# Patient Record
Sex: MALE | Race: WHITE | NOT HISPANIC OR LATINO | Employment: FULL TIME | URBAN - METROPOLITAN AREA
[De-identification: names, ages, dates, MRNs, and addresses within clinical notes are randomized per-mention and may not be internally consistent; named-entity substitution may affect disease eponyms.]

---

## 2024-03-09 ENCOUNTER — HOSPITAL ENCOUNTER (INPATIENT)
Facility: HOSPITAL | Age: 24
LOS: 2 days | Discharge: HOME | DRG: 813 | End: 2024-03-12
Attending: STUDENT IN AN ORGANIZED HEALTH CARE EDUCATION/TRAINING PROGRAM | Admitting: INTERNAL MEDICINE
Payer: COMMERCIAL

## 2024-03-09 DIAGNOSIS — D69.3 ACUTE ITP: Primary | ICD-10-CM

## 2024-03-09 DIAGNOSIS — A53.0 SYPHILIS, LATENT: ICD-10-CM

## 2024-03-09 LAB
ALBUMIN SERPL-MCNC: 4.4 G/DL (ref 3.5–5.2)
ALBUMIN/GLOB SERPL: 2 G/DL
ALP SERPL-CCNC: 54 U/L (ref 39–117)
ALT SERPL W P-5'-P-CCNC: 28 U/L (ref 1–41)
ANION GAP SERPL CALCULATED.3IONS-SCNC: 9.8 MMOL/L (ref 5–15)
AST SERPL-CCNC: 27 U/L (ref 1–40)
BASOPHILS # BLD AUTO: 0.02 10*3/MM3 (ref 0–0.2)
BASOPHILS NFR BLD AUTO: 0.4 % (ref 0–1.5)
BILIRUB SERPL-MCNC: 0.3 MG/DL (ref 0–1.2)
BUN SERPL-MCNC: 15 MG/DL (ref 6–20)
BUN/CREAT SERPL: 15.6 (ref 7–25)
CALCIUM SPEC-SCNC: 8.8 MG/DL (ref 8.6–10.5)
CHLORIDE SERPL-SCNC: 105 MMOL/L (ref 98–107)
CO2 SERPL-SCNC: 24.2 MMOL/L (ref 22–29)
CREAT SERPL-MCNC: 0.96 MG/DL (ref 0.76–1.27)
DEPRECATED RDW RBC AUTO: 41.4 FL (ref 37–54)
EGFRCR SERPLBLD CKD-EPI 2021: 113.9 ML/MIN/1.73
EOSINOPHIL # BLD AUTO: 0.05 10*3/MM3 (ref 0–0.4)
EOSINOPHIL NFR BLD AUTO: 0.9 % (ref 0.3–6.2)
ERYTHROCYTE [DISTWIDTH] IN BLOOD BY AUTOMATED COUNT: 12.8 % (ref 12.3–15.4)
GLOBULIN UR ELPH-MCNC: 2.2 GM/DL
GLUCOSE BLDC GLUCOMTR-MCNC: 128 MG/DL (ref 70–130)
GLUCOSE SERPL-MCNC: 105 MG/DL (ref 65–99)
HCT VFR BLD AUTO: 42.1 % (ref 37.5–51)
HGB BLD-MCNC: 14.5 G/DL (ref 13–17.7)
HOLD SPECIMEN: NORMAL
LYMPHOCYTES # BLD AUTO: 2.36 10*3/MM3 (ref 0.7–3.1)
LYMPHOCYTES NFR BLD AUTO: 43.4 % (ref 19.6–45.3)
MCH RBC QN AUTO: 30.9 PG (ref 26.6–33)
MCHC RBC AUTO-ENTMCNC: 34.4 G/DL (ref 31.5–35.7)
MCV RBC AUTO: 89.8 FL (ref 79–97)
MONOCYTES # BLD AUTO: 0.62 10*3/MM3 (ref 0.1–0.9)
MONOCYTES NFR BLD AUTO: 11.4 % (ref 5–12)
NEUTROPHILS NFR BLD AUTO: 2.38 10*3/MM3 (ref 1.7–7)
NEUTROPHILS NFR BLD AUTO: 43.7 % (ref 42.7–76)
PLATELET # BLD AUTO: 14 10*3/MM3 (ref 140–450)
PMV BLD AUTO: 13.4 FL (ref 6–12)
POTASSIUM SERPL-SCNC: 4 MMOL/L (ref 3.5–5.2)
PROT SERPL-MCNC: 6.6 G/DL (ref 6–8.5)
RBC # BLD AUTO: 4.69 10*6/MM3 (ref 4.14–5.8)
SODIUM SERPL-SCNC: 139 MMOL/L (ref 136–145)
WBC NRBC COR # BLD AUTO: 5.44 10*3/MM3 (ref 3.4–10.8)
WHOLE BLOOD HOLD COAG: NORMAL
WHOLE BLOOD HOLD SPECIMEN: NORMAL

## 2024-03-09 PROCEDURE — 86705 HEP B CORE ANTIBODY IGM: CPT | Performed by: INTERNAL MEDICINE

## 2024-03-09 PROCEDURE — 63710000001 PREDNISONE PER 5 MG: Performed by: INTERNAL MEDICINE

## 2024-03-09 PROCEDURE — 82948 REAGENT STRIP/BLOOD GLUCOSE: CPT

## 2024-03-09 PROCEDURE — 63710000001 PREDNISONE PER 1 MG: Performed by: INTERNAL MEDICINE

## 2024-03-09 PROCEDURE — 82607 VITAMIN B-12: CPT | Performed by: INTERNAL MEDICINE

## 2024-03-09 PROCEDURE — G0378 HOSPITAL OBSERVATION PER HR: HCPCS

## 2024-03-09 PROCEDURE — 80053 COMPREHEN METABOLIC PANEL: CPT

## 2024-03-09 PROCEDURE — 36415 COLL VENOUS BLD VENIPUNCTURE: CPT

## 2024-03-09 PROCEDURE — 99285 EMERGENCY DEPT VISIT HI MDM: CPT

## 2024-03-09 PROCEDURE — 85025 COMPLETE CBC W/AUTO DIFF WBC: CPT

## 2024-03-09 RX ORDER — SODIUM CHLORIDE 0.9 % (FLUSH) 0.9 %
10 SYRINGE (ML) INJECTION AS NEEDED
Status: DISCONTINUED | OUTPATIENT
Start: 2024-03-09 | End: 2024-03-12 | Stop reason: HOSPADM

## 2024-03-09 RX ORDER — ACETAMINOPHEN 650 MG/1
650 SUPPOSITORY RECTAL EVERY 4 HOURS PRN
Status: DISCONTINUED | OUTPATIENT
Start: 2024-03-09 | End: 2024-03-12 | Stop reason: HOSPADM

## 2024-03-09 RX ORDER — NICOTINE POLACRILEX 4 MG
15 LOZENGE BUCCAL
Status: DISCONTINUED | OUTPATIENT
Start: 2024-03-09 | End: 2024-03-11

## 2024-03-09 RX ORDER — IBUPROFEN 600 MG/1
1 TABLET ORAL
Status: DISCONTINUED | OUTPATIENT
Start: 2024-03-09 | End: 2024-03-11

## 2024-03-09 RX ORDER — BISACODYL 10 MG
10 SUPPOSITORY, RECTAL RECTAL DAILY PRN
Status: DISCONTINUED | OUTPATIENT
Start: 2024-03-09 | End: 2024-03-12 | Stop reason: HOSPADM

## 2024-03-09 RX ORDER — AMOXICILLIN 250 MG
2 CAPSULE ORAL 2 TIMES DAILY PRN
Status: DISCONTINUED | OUTPATIENT
Start: 2024-03-09 | End: 2024-03-12 | Stop reason: HOSPADM

## 2024-03-09 RX ORDER — BISACODYL 5 MG/1
5 TABLET, DELAYED RELEASE ORAL DAILY PRN
Status: DISCONTINUED | OUTPATIENT
Start: 2024-03-09 | End: 2024-03-12 | Stop reason: HOSPADM

## 2024-03-09 RX ORDER — INSULIN LISPRO 100 [IU]/ML
2-7 INJECTION, SOLUTION INTRAVENOUS; SUBCUTANEOUS
Status: DISCONTINUED | OUTPATIENT
Start: 2024-03-09 | End: 2024-03-11

## 2024-03-09 RX ORDER — ACETAMINOPHEN 325 MG/1
650 TABLET ORAL EVERY 4 HOURS PRN
Status: DISCONTINUED | OUTPATIENT
Start: 2024-03-09 | End: 2024-03-12 | Stop reason: HOSPADM

## 2024-03-09 RX ORDER — ACETAMINOPHEN 160 MG/5ML
650 SOLUTION ORAL EVERY 4 HOURS PRN
Status: DISCONTINUED | OUTPATIENT
Start: 2024-03-09 | End: 2024-03-12 | Stop reason: HOSPADM

## 2024-03-09 RX ORDER — ONDANSETRON 2 MG/ML
4 INJECTION INTRAMUSCULAR; INTRAVENOUS EVERY 6 HOURS PRN
Status: DISCONTINUED | OUTPATIENT
Start: 2024-03-09 | End: 2024-03-12 | Stop reason: HOSPADM

## 2024-03-09 RX ORDER — POLYETHYLENE GLYCOL 3350 17 G/17G
17 POWDER, FOR SOLUTION ORAL DAILY PRN
Status: DISCONTINUED | OUTPATIENT
Start: 2024-03-09 | End: 2024-03-12 | Stop reason: HOSPADM

## 2024-03-09 RX ORDER — DEXTROSE MONOHYDRATE 25 G/50ML
25 INJECTION, SOLUTION INTRAVENOUS
Status: DISCONTINUED | OUTPATIENT
Start: 2024-03-09 | End: 2024-03-11

## 2024-03-09 RX ADMIN — PREDNISONE 70 MG: 20 TABLET ORAL at 19:57

## 2024-03-09 NOTE — ED PROVIDER NOTES
EMERGENCY DEPARTMENT ENCOUNTER  Room Number:  S511/1  PCP: Bertha Riggs MD  Independent Historians: Patient and Family      HPI:  Chief Complaint: had concerns including Abnormal Lab.     Context: Choco Rico is a 23 y.o. male who presents to the ED c/o acute bruising and low platelets.  Patient is noted significant bruising on his upper extremities that seems to occur without any prompting.  Patient particularly concerned about 1 located on the medial aspect of his left bicep with no traumatic injury experienced.  Patient had evaluation by primary care with outpatient labs ordered.  Labs returned with very low platelet count and patient was instructed to come to the emergency department.  Patient denies recent respiratory illness, chest pain, shortness of breath.      Review of prior external notes (non-ED) -and- Review of prior external test results outside of this encounter: Extensive review of the EPIC system as well as [facility] reveals no prior visit notes and no prior diagnostic studies available for review.    PAST MEDICAL HISTORY  Active Ambulatory Problems     Diagnosis Date Noted    No Active Ambulatory Problems     Resolved Ambulatory Problems     Diagnosis Date Noted    No Resolved Ambulatory Problems     No Additional Past Medical History         PAST SURGICAL HISTORY  History reviewed. No pertinent surgical history.      FAMILY HISTORY  History reviewed. No pertinent family history.      SOCIAL HISTORY  Social History     Socioeconomic History    Marital status: Single   Tobacco Use    Smoking status: Never    Smokeless tobacco: Never   Substance and Sexual Activity    Alcohol use: Defer    Drug use: Defer    Sexual activity: Yes         ALLERGIES  Patient has no known allergies.      REVIEW OF SYSTEMS  Review of Systems  Included in HPI  All systems reviewed and negative except for those discussed in HPI.      PHYSICAL EXAM    I have reviewed the triage vital signs and nursing  notes.    ED Triage Vitals   Temp Heart Rate Resp BP SpO2   03/09/24 1654 03/09/24 1654 03/09/24 1654 03/09/24 1712 03/09/24 1654   97 °F (36.1 °C) 82 20 114/65 100 %      Temp src Heart Rate Source Patient Position BP Location FiO2 (%)   03/09/24 1654 03/09/24 1654 -- -- --   Tympanic Monitor          Physical Exam  GENERAL: alert, no acute distress  SKIN: Warm, dry.  Ecchymosis noted to the medial aspect of the left bicep  HENT: Normocephalic, atraumatic  EYES: no scleral icterus  CV: regular rhythm, regular rate  RESPIRATORY: normal effort, lungs clear  ABDOMEN: soft, nontender, nondistended  MUSCULOSKELETAL: no deformity  NEURO: alert, moves all extremities, follows commands            LAB RESULTS  Recent Results (from the past 24 hour(s))   Lavender Top    Collection Time: 03/09/24  5:02 PM   Result Value Ref Range    Extra Tube hold for add-on    Gold Top - SST    Collection Time: 03/09/24  5:02 PM   Result Value Ref Range    Extra Tube Hold for add-ons.    Light Blue Top    Collection Time: 03/09/24  5:02 PM   Result Value Ref Range    Extra Tube Hold for add-ons.    CBC Auto Differential    Collection Time: 03/09/24  5:02 PM    Specimen: Blood   Result Value Ref Range    WBC 5.44 3.40 - 10.80 10*3/mm3    RBC 4.69 4.14 - 5.80 10*6/mm3    Hemoglobin 14.5 13.0 - 17.7 g/dL    Hematocrit 42.1 37.5 - 51.0 %    MCV 89.8 79.0 - 97.0 fL    MCH 30.9 26.6 - 33.0 pg    MCHC 34.4 31.5 - 35.7 g/dL    RDW 12.8 12.3 - 15.4 %    RDW-SD 41.4 37.0 - 54.0 fl    MPV 13.4 (H) 6.0 - 12.0 fL    Platelets 14 (C) 140 - 450 10*3/mm3    Neutrophil % 43.7 42.7 - 76.0 %    Lymphocyte % 43.4 19.6 - 45.3 %    Monocyte % 11.4 5.0 - 12.0 %    Eosinophil % 0.9 0.3 - 6.2 %    Basophil % 0.4 0.0 - 1.5 %    Neutrophils, Absolute 2.38 1.70 - 7.00 10*3/mm3    Lymphocytes, Absolute 2.36 0.70 - 3.10 10*3/mm3    Monocytes, Absolute 0.62 0.10 - 0.90 10*3/mm3    Eosinophils, Absolute 0.05 0.00 - 0.40 10*3/mm3    Basophils, Absolute 0.02 0.00 - 0.20  10*3/mm3   Comprehensive Metabolic Panel    Collection Time: 03/09/24  5:43 PM    Specimen: Blood   Result Value Ref Range    Glucose 105 (H) 65 - 99 mg/dL    BUN 15 6 - 20 mg/dL    Creatinine 0.96 0.76 - 1.27 mg/dL    Sodium 139 136 - 145 mmol/L    Potassium 4.0 3.5 - 5.2 mmol/L    Chloride 105 98 - 107 mmol/L    CO2 24.2 22.0 - 29.0 mmol/L    Calcium 8.8 8.6 - 10.5 mg/dL    Total Protein 6.6 6.0 - 8.5 g/dL    Albumin 4.4 3.5 - 5.2 g/dL    ALT (SGPT) 28 1 - 41 U/L    AST (SGOT) 27 1 - 40 U/L    Alkaline Phosphatase 54 39 - 117 U/L    Total Bilirubin 0.3 0.0 - 1.2 mg/dL    Globulin 2.2 gm/dL    A/G Ratio 2.0 g/dL    BUN/Creatinine Ratio 15.6 7.0 - 25.0    Anion Gap 9.8 5.0 - 15.0 mmol/L    eGFR 113.9 >60.0 mL/min/1.73         RADIOLOGY  No Radiology Exams Resulted Within Past 24 Hours      MEDICATIONS GIVEN IN ER  Medications   sodium chloride 0.9 % flush 10 mL (has no administration in time range)   acetaminophen (TYLENOL) tablet 650 mg (has no administration in time range)     Or   acetaminophen (TYLENOL) 160 MG/5ML oral solution 650 mg (has no administration in time range)     Or   acetaminophen (TYLENOL) suppository 650 mg (has no administration in time range)   ondansetron (ZOFRAN) injection 4 mg (has no administration in time range)   sennosides-docusate (PERICOLACE) 8.6-50 MG per tablet 2 tablet (has no administration in time range)     And   polyethylene glycol (MIRALAX) packet 17 g (has no administration in time range)     And   bisacodyl (DULCOLAX) EC tablet 5 mg (has no administration in time range)     And   bisacodyl (DULCOLAX) suppository 10 mg (has no administration in time range)         ORDERS PLACED DURING THIS VISIT:  Orders Placed This Encounter   Procedures    [place] Draw    Comprehensive Metabolic Panel    CBC Auto Differential    Basic Metabolic Panel    CBC Auto Differential    Diet: Regular/House; Fluid Consistency: Thin (IDDSI 0)    Vital Signs    Up With Assistance    Intake & Output     Oral Care    Place Sequential Compression Device    Maintain Sequential Compression Device    Code Status and Medical Interventions:    Inpatient Hematology & Oncology Consult    Insert peripheral IV    Initiate Observation Status    CBC & Differential    Lavender Top    Gold Top - SST    Light Blue Top         OUTPATIENT MEDICATION MANAGEMENT:  Current Facility-Administered Medications Ordered in Epic   Medication Dose Route Frequency Provider Last Rate Last Admin    acetaminophen (TYLENOL) tablet 650 mg  650 mg Oral Q4H PRN Mera Rios MD        Or    acetaminophen (TYLENOL) 160 MG/5ML oral solution 650 mg  650 mg Oral Q4H PRN Mera Rios MD        Or    acetaminophen (TYLENOL) suppository 650 mg  650 mg Rectal Q4H PRN Blanca, Mera Rapp MD        sennosides-docusate (PERICOLACE) 8.6-50 MG per tablet 2 tablet  2 tablet Oral BID PRN Mera Rios MD        And    polyethylene glycol (MIRALAX) packet 17 g  17 g Oral Daily PRN Blanca, Mera Rapp MD        And    bisacodyl (DULCOLAX) EC tablet 5 mg  5 mg Oral Daily PRN Blanca, Mera Rapp MD        And    bisacodyl (DULCOLAX) suppository 10 mg  10 mg Rectal Daily PRN Blanca, Mera Rapp MD        ondansetron (ZOFRAN) injection 4 mg  4 mg Intravenous Q6H PRN Blanca, Mera Rapp MD        sodium chloride 0.9 % flush 10 mL  10 mL Intravenous PRN Emergency, Triage Protocol, MD         No current [facility]-ordered outpatient medications on file.         PROGRESS, DATA ANALYSIS, CONSULTS, AND MEDICAL DECISION MAKING  All labs have been independently interpreted by me.  All radiology studies have been reviewed by me. All EKG's have been independently viewed and interpreted by me.  Discussion below represents my analysis of pertinent findings related to patient's condition, differential diagnosis, treatment plan and final disposition.    Differential diagnosis includes but is not limited to ecchymosis, ITP, TTP.    Clinical Scores:                    ED Course as of 03/09/24 1840   Sat Mar 09, 2024   1840 23-year-old male presenting for evaluation of spontaneous bruising.  Patient evaluated outpatient clinic and found to have low platelets and sent for further evaluation.  Confirm platelet count of 14 in the emergency department.  Patient will require admission for further evaluation of ITP. [MW]      ED Course User Index  [MW] Mark Wagner MD             AS OF 18:40 EST VITALS:    BP - 120/67  HR - 82  TEMP - 97 °F (36.1 °C) (Tympanic)  O2 SATS - 100%    COMPLEXITY OF CARE  The patient requires admission.      DIAGNOSIS  Final diagnoses:   Acute ITP         DISPOSITION  ED Disposition       ED Disposition   Decision to Admit    Condition   --    Comment   Level of Care: Med/Surg [1]   Diagnosis: Acute ITP [[id number]]   Admitting Physician: ISIDORO MONCADA [[id number]]   Attending Physician: ISIDORO MONCADA [[id number]]                  Please note that portions of this document were completed with a voice recognition program.    Note Disclaimer: At [facility], we believe that sharing information builds trust and better relationships. You are receiving this note because you recently visited [facility]. It is possible you will see health information before a provider has talked with you about it. This kind of information can be easy to misunderstand. To help you fully understand what it means for your health, we urge you to discuss this note with your provider.         Mark Wagner MD  03/09/24 1840

## 2024-03-09 NOTE — ED NOTES
Nursing report ED to floor  Redfox Rico  23 y.o.  male    HPI :   Chief Complaint   Patient presents with    Abnormal Lab       Admitting doctor:   Mera Rios MD    Admitting diagnosis:   The encounter diagnosis was Acute ITP.    Code status:   Current Code Status       Date Active Code Status Order ID Comments User Context       Not on file            Allergies:   Patient has no known allergies.    Intake and Output  No intake or output data in the 24 hours ending 03/09/24 1743    Weight:   There were no vitals filed for this visit.    Most recent vitals:   Vitals:    03/09/24 1654 03/09/24 1712 03/09/24 1715   BP:  114/65 120/67   Pulse: 82     Resp: 20     Temp: 97 °F (36.1 °C)     TempSrc: Tympanic     SpO2: 100%         Active LDAs/IV Access:   Lines, Drains & Airways       Active LDAs       Name Placement date Placement time Site Days    Peripheral IV 03/09/24 1713 Left Antecubital 03/09/24  1713  Antecubital  less than 1                    Labs (abnormal labs have a star):   Labs Reviewed   CBC WITH AUTO DIFFERENTIAL - Abnormal; Notable for the following components:       Result Value    MPV 13.4 (*)     Platelets 14 (*)     All other components within normal limits   RAINBOW DRAW    Narrative:     The following orders were created for panel order [place] Draw.  Procedure                               Abnormality         Status                     ---------                               -----------         ------                     Green Top (Gel)[[id number]]                                                             Lavender Top[[id number]]                                     In process                 Gold Top - SST[[id number]]                                   In process                 Light Blue Top[[id number]]                                   In process                   Please view results for these tests on the individual orders.   COMPREHENSIVE METABOLIC PANEL   CBC AND DIFFERENTIAL     Narrative:     The following orders were created for panel order CBC & Differential.  Procedure                               Abnormality         Status                     ---------                               -----------         ------                     CBC Auto Differential[[id number]]        Abnormal            Final result                 Please view results for these tests on the individual orders.   LAVENDER TOP   GOLD TOP - SST   LIGHT BLUE TOP       EKG:   No orders to display       Meds given in ED:   Medications   sodium chloride 0.9 % flush 10 mL (has no administration in time range)       Imaging results:  No radiology results for the last day    Ambulatory status:   - up ad gretchen    Social issues:   Social History     Socioeconomic History    Marital status: Single       NIH Stroke Scale:        Nursing report ED to floor:

## 2024-03-09 NOTE — ED NOTES
Pt to ed from home via PV    Pt had blood work done earlier today because he has been noticing random bruises all over body. Pt was called and told his platelets are low.

## 2024-03-09 NOTE — LETTER
March 12, 2024     Patient: Choco Rico   YOB: 2000   Date of Visit: 3/9/2024       To Whom It May Concern:    Pt admitted to [facility] 3/9/2024. Per Dr. Parker pt to be off work until cleared completely by the Hematology doctor, follow up scheduled for March 18th. Any questions or concerns please call [facility].     Sincerely,  Robina London RN

## 2024-03-09 NOTE — H&P
HISTORY AND PHYSICAL   [facility]        Date of Admission: 3/9/2024  Patient Identification:  Name: Choco Rico  Age: 23 y.o.  Sex: male  :  2000  MRN: [id number]                     Primary Care Physician: Bertha Riggs MD    Chief Complaint:  23 year old gentleman with easy bruising for the last two weeks; he had labs done and was called to go to the ED due to a low platelet count; he denies any prior history of this; no family history of blood disorders; no recent acute illness; no active bleeding    History of Present Illness:   As above    Past Medical History:  History reviewed. No pertinent past medical history.  Past Surgical History:  History reviewed. No pertinent surgical history.   Home Meds:  No medications prior to admission.       Allergies:  No Known Allergies  Immunizations:  Immunization History   Administered Date(s) Administered    COVID-19 (MODERNA) 1st,2nd,3rd Dose Monovalent 2022     Social History:   Social History     Social History Narrative    Not on file     Social History     Socioeconomic History    Marital status: Single   Tobacco Use    Smoking status: Never    Smokeless tobacco: Never   Substance and Sexual Activity    Alcohol use: Defer    Drug use: Defer    Sexual activity: Yes       Family History:  History reviewed. No pertinent family history.     Review of Systems  See history of present illness and past medical history.  Patient denies headache, dizziness, syncope, falls, trauma, change in vision, change in hearing, change in taste, changes in weight, changes in appetite, focal weakness, numbness, or paresthesia.  Patient denies chest pain, palpitations, dyspnea, orthopnea, PND, cough, sinus pressure, rhinorrhea, epistaxis, hemoptysis, nausea, vomiting,hematemesis, diarrhea, constipation or hematochezia.  Denies cold or heat intolerance, polydipsia, polyuria, polyphagia. Denies hematuria, pyuria, dysuria, hesitancy, frequency or urgency.  "Denies consumption of raw and under cooked meats foods or change in water source.  Denies fever, chills, sweats, night sweats.    Objective:  T Max 24 hrs: Temp (24hrs), Av °F (36.1 °C), Min:97 °F (36.1 °C), Max:97 °F (36.1 °C)    Vitals Ranges:   Temp:  [97 °F (36.1 °C)] 97 °F (36.1 °C)  Heart Rate:  [82] 82  Resp:  [20] 20  BP: (114-120)/(65-67) 120/67      Exam:  /67   Pulse 82   Temp 97 °F (36.1 °C) (Tympanic)   Resp 20   Ht 180.3 cm (71\")   Wt 72.6 kg (160 lb)   SpO2 100%   BMI 22.32 kg/m²     General Appearance:    Alert, cooperative, no distress, appears stated age   Head:    Normocephalic, without obvious abnormality, atraumatic   Eyes:    PERRL, conjunctivae/corneas clear, EOM's intact, both eyes   Ears:    Normal external ear canals, both ears   Nose:   Nares normal, septum midline, mucosa normal, no drainage    or sinus tenderness   Throat:   Lips, mucosa, and tongue normal   Neck:   Supple, symmetrical, trachea midline, no adenopathy;     thyroid:  no enlargement/tenderness/nodules; no carotid    bruit or JVD   Back:     Symmetric, no curvature, ROM normal, no CVA tenderness   Lungs:     Clear to auscultation bilaterally, respirations unlabored   Chest Wall:    No tenderness or deformity    Heart:    Regular rate and rhythm, S1 and S2 normal, no murmur, rub   or gallop   Abdomen:     Soft, nontender, bowel sounds active all four quadrants,     no masses, no hepatomegaly, no splenomegaly   Extremities:   Extremities normal, atraumatic, bruising upper extremities      .    Data Review:  Labs in chart were reviewed.  WBC   Date Value Ref Range Status   2024 5.44 3.40 - 10.80 10*3/mm3 Final     Hemoglobin   Date Value Ref Range Status   2024 14.5 13.0 - 17.7 g/dL Final     Hematocrit   Date Value Ref Range Status   2024 42.1 37.5 - 51.0 % Final     Platelets   Date Value Ref Range Status   2024 14 (C) 140 - 450 10*3/mm3 Final     Sodium   Date Value Ref Range Status "   03/09/2024 139 136 - 145 mmol/L Final     Potassium   Date Value Ref Range Status   03/09/2024 4.0 3.5 - 5.2 mmol/L Final     Chloride   Date Value Ref Range Status   03/09/2024 105 98 - 107 mmol/L Final     CO2   Date Value Ref Range Status   03/09/2024 24.2 22.0 - 29.0 mmol/L Final     BUN   Date Value Ref Range Status   03/09/2024 15 6 - 20 mg/dL Final     Creatinine   Date Value Ref Range Status   03/09/2024 0.96 0.76 - 1.27 mg/dL Final     Glucose   Date Value Ref Range Status   03/09/2024 105 (H) 65 - 99 mg/dL Final     Calcium   Date Value Ref Range Status   03/09/2024 8.8 8.6 - 10.5 mg/dL Final                Imaging Results (All)       None              Assessment:  Active Hospital Problems    Diagnosis  POA    **Acute ITP [D69.3]  Yes      Resolved Hospital Problems   No resolved problems to display.   hyperglycemia    Plan:  Ask hematology to see him  Start prednisone 1mg/kg  Accu checks, sliding scale insulin  D w patient and ed provider    Mera Rios MD  3/9/2024  18:42 EST

## 2024-03-10 PROBLEM — R73.9 HYPERGLYCEMIA: Status: ACTIVE | Noted: 2024-03-10

## 2024-03-10 LAB
ANION GAP SERPL CALCULATED.3IONS-SCNC: 11 MMOL/L (ref 5–15)
BASOPHILS # BLD AUTO: 0.01 10*3/MM3 (ref 0–0.2)
BASOPHILS NFR BLD AUTO: 0.1 % (ref 0–1.5)
BUN SERPL-MCNC: 12 MG/DL (ref 6–20)
BUN/CREAT SERPL: 15.4 (ref 7–25)
CALCIUM SPEC-SCNC: 9.8 MG/DL (ref 8.6–10.5)
CHLORIDE SERPL-SCNC: 105 MMOL/L (ref 98–107)
CO2 SERPL-SCNC: 22 MMOL/L (ref 22–29)
CREAT SERPL-MCNC: 0.78 MG/DL (ref 0.76–1.27)
CRP SERPL-MCNC: <0.3 MG/DL (ref 0–0.5)
DEPRECATED RDW RBC AUTO: 39.1 FL (ref 37–54)
EGFRCR SERPLBLD CKD-EPI 2021: 128.5 ML/MIN/1.73
EOSINOPHIL # BLD AUTO: 0 10*3/MM3 (ref 0–0.4)
EOSINOPHIL NFR BLD AUTO: 0 % (ref 0.3–6.2)
ERYTHROCYTE [DISTWIDTH] IN BLOOD BY AUTOMATED COUNT: 12.5 % (ref 12.3–15.4)
ERYTHROCYTE [SEDIMENTATION RATE] IN BLOOD: 1 MM/HR (ref 0–15)
GLUCOSE BLDC GLUCOMTR-MCNC: 123 MG/DL (ref 70–130)
GLUCOSE BLDC GLUCOMTR-MCNC: 124 MG/DL (ref 70–130)
GLUCOSE BLDC GLUCOMTR-MCNC: 134 MG/DL (ref 70–130)
GLUCOSE BLDC GLUCOMTR-MCNC: 147 MG/DL (ref 70–130)
GLUCOSE SERPL-MCNC: 134 MG/DL (ref 65–99)
HCT VFR BLD AUTO: 45.3 % (ref 37.5–51)
HGB BLD-MCNC: 15.3 G/DL (ref 13–17.7)
LYMPHOCYTES # BLD AUTO: 1.16 10*3/MM3 (ref 0.7–3.1)
LYMPHOCYTES NFR BLD AUTO: 15.1 % (ref 19.6–45.3)
MCH RBC QN AUTO: 29.3 PG (ref 26.6–33)
MCHC RBC AUTO-ENTMCNC: 33.8 G/DL (ref 31.5–35.7)
MCV RBC AUTO: 86.8 FL (ref 79–97)
MONOCYTES # BLD AUTO: 0.11 10*3/MM3 (ref 0.1–0.9)
MONOCYTES NFR BLD AUTO: 1.4 % (ref 5–12)
NEUTROPHILS NFR BLD AUTO: 6.37 10*3/MM3 (ref 1.7–7)
NEUTROPHILS NFR BLD AUTO: 83.3 % (ref 42.7–76)
PLATELET # BLD AUTO: 23 10*3/MM3 (ref 140–450)
PLATELET # BLD AUTO: 23 10*3/MM3 (ref 140–450)
PLATELETS.RETICULATED NFR BLD AUTO: 20.1 % (ref 0.9–6.5)
PMV BLD AUTO: 11.4 FL (ref 6–12)
POTASSIUM SERPL-SCNC: 4.2 MMOL/L (ref 3.5–5.2)
RBC # BLD AUTO: 5.22 10*6/MM3 (ref 4.14–5.8)
SODIUM SERPL-SCNC: 138 MMOL/L (ref 136–145)
VIT B12 BLD-MCNC: 316 PG/ML (ref 211–946)
WBC NRBC COR # BLD AUTO: 7.66 10*3/MM3 (ref 3.4–10.8)

## 2024-03-10 PROCEDURE — 63710000001 PREDNISONE PER 5 MG: Performed by: INTERNAL MEDICINE

## 2024-03-10 PROCEDURE — 99204 OFFICE O/P NEW MOD 45 MIN: CPT | Performed by: INTERNAL MEDICINE

## 2024-03-10 PROCEDURE — 86235 NUCLEAR ANTIGEN ANTIBODY: CPT | Performed by: INTERNAL MEDICINE

## 2024-03-10 PROCEDURE — 86225 DNA ANTIBODY NATIVE: CPT | Performed by: INTERNAL MEDICINE

## 2024-03-10 PROCEDURE — 85652 RBC SED RATE AUTOMATED: CPT | Performed by: INTERNAL MEDICINE

## 2024-03-10 PROCEDURE — 63710000001 PREDNISONE PER 1 MG: Performed by: INTERNAL MEDICINE

## 2024-03-10 PROCEDURE — 86140 C-REACTIVE PROTEIN: CPT | Performed by: INTERNAL MEDICINE

## 2024-03-10 PROCEDURE — 80048 BASIC METABOLIC PNL TOTAL CA: CPT | Performed by: INTERNAL MEDICINE

## 2024-03-10 PROCEDURE — 85014 HEMATOCRIT: CPT | Performed by: INTERNAL MEDICINE

## 2024-03-10 PROCEDURE — 36415 COLL VENOUS BLD VENIPUNCTURE: CPT | Performed by: INTERNAL MEDICINE

## 2024-03-10 PROCEDURE — 82747 ASSAY OF FOLIC ACID RBC: CPT | Performed by: INTERNAL MEDICINE

## 2024-03-10 PROCEDURE — 82948 REAGENT STRIP/BLOOD GLUCOSE: CPT

## 2024-03-10 PROCEDURE — 85055 RETICULATED PLATELET ASSAY: CPT | Performed by: INTERNAL MEDICINE

## 2024-03-10 PROCEDURE — 85025 COMPLETE CBC W/AUTO DIFF WBC: CPT | Performed by: INTERNAL MEDICINE

## 2024-03-10 RX ADMIN — PREDNISONE 70 MG: 20 TABLET ORAL at 08:56

## 2024-03-10 NOTE — CONSULTS
Subjective     REASON FOR CONSULTATION:    Provide an opinion on any further workup or treatment                             REQUESTING PHYSICIAN:      RECORDS OBTAINED:  Records of the patients history including those obtained from the referring provider were reviewed and summarized in detail.    HISTORY OF PRESENT ILLNESS:  The patient is a 23 y.o. year old male who is here for an opinion about the above issue.    History of Present Illness   Patient is a 23-year-old gentleman who has had easy bruising for the last 2 weeks.  He was admitted March 9, 2024.  Patient had blood work drawn and because of low platelets he was sent to the ER.  There was no active bleeding.  His platelets have been 14.  On admission.  His hemoglobin is 14.5 and a white count of 5.44.  Patient has been started on prednisone 1 mg/kg by Dr. Stevens because of concern for ITP.  Patient has been asymptomatic.  Today's platelets are up to 23.  Hemoglobin 15.3 white count of 7.66.  Neutrophils 83%, lymphocytes 15.1%, 1.4% monocytes 0% eosinophils.    History reviewed. No pertinent past medical history.     History reviewed. No pertinent surgical history.     No current facility-administered medications on file prior to encounter.     No current outpatient medications on file prior to encounter.        ALLERGIES:  No Known Allergies     Social History     Socioeconomic History    Marital status: Single   Tobacco Use    Smoking status: Never    Smokeless tobacco: Never   Vaping Use    Vaping status: Every Day    Substances: Nicotine, Flavoring    Devices: Disposable   Substance and Sexual Activity    Alcohol use: Yes     Alcohol/week: 1.0 standard drink of alcohol     Types: 1 Drinks containing 0.5 oz of alcohol per week     Comment: occ drinker not every week    Drug use: Defer     Types: Marijuana    Sexual activity: Yes        Family History   Problem Relation Age of Onset    Cancer Maternal Uncle     Cancer Maternal Grandmother     Cancer  Paternal Grandmother         Review of Systems   Constitutional:  Negative for appetite change, chills, diaphoresis, fatigue, fever and unexpected weight change.   HENT:  Negative for hearing loss, sore throat and trouble swallowing.    Respiratory:  Negative for cough, chest tightness, shortness of breath and wheezing.    Cardiovascular:  Negative for chest pain, palpitations and leg swelling.   Gastrointestinal:  Negative for abdominal distention, abdominal pain, constipation, diarrhea, nausea and vomiting.   Genitourinary:  Negative for dysuria, frequency, hematuria and urgency.   Musculoskeletal:  Negative for joint swelling.        No muscle weakness.   Skin:  Negative for rash and wound.   Neurological:  Negative for seizures, syncope, speech difficulty, weakness, numbness and headaches.   Hematological:  Negative for adenopathy. Does not bruise/bleed easily.   Psychiatric/Behavioral:  Negative for behavioral problems, confusion and suicidal ideas.           Objective     Vitals:    03/09/24 1955 03/09/24 2341 03/10/24 0437 03/10/24 0714   BP: 116/66 131/80 122/82 122/70   BP Location: Right arm Right arm Right arm Right arm   Patient Position: Lying Lying Lying Lying   Pulse: 59 65 72 76   Resp: 16 16 16 16   Temp: 99.1 °F (37.3 °C) 99 °F (37.2 °C) 98.4 °F (36.9 °C) 98.6 °F (37 °C)   TempSrc: Oral Oral Oral Oral   SpO2: 99% 100% 99% 100%   Weight:       Height:              No data to display                Physical Exam    RESPIRATORY:  Normal respiratory effort.  No rales  or wheezing, clear.   CARDIOVASCULAR:  Regular rate and rhythm, no murmur  No significant lower extremity edema.  Abdomen: Soft nontender positive bowel sounds no hepatosplenomegaly  SKIN: No wounds.  No rashes.  MUSCULOSKELETAL/EXTREMITIES: No clubbing or cyanosis.  No apparent unilateral weakness.  NEURO: CN 2-12 appear intact. No focal neurological deficits noted.  PSYCHIATRIC:  Normal judgment and insight.  Normal mood and  affect.      RECENT LABS:  Hematology WBC   Date Value Ref Range Status   03/10/2024 7.66 3.40 - 10.80 10*3/mm3 Final     RBC   Date Value Ref Range Status   03/10/2024 5.22 4.14 - 5.80 10*6/mm3 Final     Hemoglobin   Date Value Ref Range Status   03/10/2024 15.3 13.0 - 17.7 g/dL Final     Hematocrit   Date Value Ref Range Status   03/10/2024 45.3 37.5 - 51.0 % Final     Platelets   Date Value Ref Range Status   03/10/2024 23 (C) 140 - 450 10*3/mm3 Final          Assessment & Plan     #.  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  No active bleeding currently.  Continue prednisone 1 mg/kg body weight  No risk factors for HIV, patient states HIV test was done at [facility], results pending  Elevated IPF.  Reviewed peripheral smear and there is decreased platelets but no evidence of schistocytes or polychromasia  No active bleeding  Will follow    Susannah Gr MD

## 2024-03-10 NOTE — PROGRESS NOTES
Name: [place] ADMIT: 3/9/2024   : 2000  PCP: Bertha Riggs MD    MRN: [id number] LOS: 0 days   AGE/SEX: 23 y.o. male  ROOM: [facility]     Subjective   Subjective   Denies any complaint. No chest pain or shortness of breath. Some bleeding from blood draw. Some bruising.     Objective   Objective   Vital Signs  Temp:  [97 °F (36.1 °C)-99.1 °F (37.3 °C)] 98.1 °F (36.7 °C)  Heart Rate:  [59-82] 67  Resp:  [16-20] 16  BP: (113-131)/(65-82) 113/76  SpO2:  [96 %-100 %] 96 %  on   ;   Device (Oxygen Therapy): room air  Body mass index is 22.32 kg/m².    Physical Exam  Constitutional:       General: He is not in acute distress.     Appearance: Normal appearance. He is not toxic-appearing.   HENT:      Head: Normocephalic and atraumatic.   Cardiovascular:      Rate and Rhythm: Normal rate and regular rhythm.   Pulmonary:      Effort: Pulmonary effort is normal. No respiratory distress.      Breath sounds: Normal breath sounds. No wheezing or rhonchi.   Abdominal:      General: Bowel sounds are normal.      Palpations: Abdomen is soft.      Tenderness: There is no abdominal tenderness. There is no guarding or rebound.   Musculoskeletal:         General: No swelling.      Right lower leg: No edema.      Left lower leg: No edema.   Skin:     General: Skin is warm and dry.      Findings: Bruising present.   Neurological:      General: No focal deficit present.      Mental Status: He is alert and oriented to person, place, and time.   Psychiatric:         Mood and Affect: Mood normal.         Behavior: Behavior normal.     Results Review  I reviewed the patient's new clinical results.  Results from last 7 days   Lab Units 03/10/24  0624 24  1702   WBC 10*3/mm3 7.66 5.44   HEMOGLOBIN g/dL 15.3 14.5   PLATELETS 10*3/mm3 23* 14*     Results from last 7 days   Lab Units 03/10/24  0623 24  1743   SODIUM mmol/L 138 139   POTASSIUM mmol/L 4.2 4.0   CHLORIDE mmol/L 105 105   CO2 mmol/L 22.0 24.2   BUN mg/dL 12  15   CREATININE mg/dL 0.78 0.96   GLUCOSE mg/dL 134* 105*     Lab Results   Component Value Date    ANIONGAP 11.0 03/10/2024     Estimated Creatinine Clearance: 151.3 mL/min (by C-G formula based on SCr of 0.78 mg/dL).   Lab Results   Component Value Date    EGFR 128.5 03/10/2024     Results from last 7 days   Lab Units 03/09/24  1743   ALBUMIN g/dL 4.4   BILIRUBIN mg/dL 0.3   ALK PHOS U/L 54   AST (SGOT) U/L 27   ALT (SGPT) U/L 28     Results from last 7 days   Lab Units 03/10/24  0623 03/09/24  1743   CALCIUM mg/dL 9.8 8.8   ALBUMIN g/dL  --  4.4       Glucose   Date/Time Value Ref Range Status   03/10/2024 1134 134 (H) 70 - 130 mg/dL Final   03/10/2024 0558 124 70 - 130 mg/dL Final   03/09/2024 2041 128 70 - 130 mg/dL Final       No radiology results for the last day    Scheduled Meds  insulin lispro, 2-7 Units, Subcutaneous, 4x Daily AC & at Bedtime  predniSONE, 70 mg, Oral, Daily    Continuous Infusions   PRN Meds    acetaminophen **OR** acetaminophen **OR** acetaminophen    senna-docusate sodium **AND** polyethylene glycol **AND** bisacodyl **AND** bisacodyl    dextrose    dextrose    glucagon (human recombinant)    ondansetron    sodium chloride     Diet  Diet: Regular/House; Fluid Consistency: Thin (IDDSI 0)    I have personally reviewed:  [x]  Medications  [x]  Laboratory   []  Microbiology   []  Radiology   [x]  EKG/Telemetry sinus on telemetry  []  Cardiology/Vascular   []  Pathology   []  Records      Assessment/Plan     Active Hospital Problems    Diagnosis  POA    **Acute ITP [D69.3]  Yes    Hyperglycemia [R73.9]  Unknown      Resolved Hospital Problems   No resolved problems to display.     23 y.o. male with easy bruising and thrombocytopenia    Thrombocytopenia  Steroids for probably ITP  Hematology evaluation/workup    Hyperglycemia  Probably from steroids currently on correctional insulin    Discharge  TBD  Expected discharge date/ time has not been documented.    Discussed with patient and nursing  staff    Toby Parker MD  [facility]  03/10/24

## 2024-03-10 NOTE — PLAN OF CARE
Goal Outcome Evaluation:         Primary care doc in pt room speaking to pt at this time.

## 2024-03-10 NOTE — PLAN OF CARE
Goal Outcome Evaluation:         Updates given to pt on Oncologist plan. Pt A&O x4. Pt in RA. Pt has no c/o pain of discomfort at this time. Pt had 100% lunch brought by his father; father at bedside. Call light in reach.

## 2024-03-10 NOTE — PLAN OF CARE
Goal Outcome Evaluation:  Plan of Care Reviewed With: patient, father        Progress: no change  Outcome Evaluation: pt has a cosult pending with hematologist to see this am 3/10/24  Pt is resting well on room air w/o any signs of distress nor pain. Pt is resting on room air, resp rate unlabored and pt is easy to arouse for assessment questions. Pt is alert and oriented x's 4

## 2024-03-11 LAB
ALBUMIN SERPL-MCNC: 4.5 G/DL (ref 3.5–5.2)
ALBUMIN/GLOB SERPL: 1.9 G/DL
ALP SERPL-CCNC: 55 U/L (ref 39–117)
ALT SERPL W P-5'-P-CCNC: 30 U/L (ref 1–41)
ANION GAP SERPL CALCULATED.3IONS-SCNC: 10.2 MMOL/L (ref 5–15)
AST SERPL-CCNC: 21 U/L (ref 1–40)
BILIRUB SERPL-MCNC: 0.5 MG/DL (ref 0–1.2)
BUN SERPL-MCNC: 19 MG/DL (ref 6–20)
BUN/CREAT SERPL: 22.6 (ref 7–25)
CALCIUM SPEC-SCNC: 9.6 MG/DL (ref 8.6–10.5)
CHLORIDE SERPL-SCNC: 103 MMOL/L (ref 98–107)
CO2 SERPL-SCNC: 25.8 MMOL/L (ref 22–29)
CREAT SERPL-MCNC: 0.84 MG/DL (ref 0.76–1.27)
CRYPTOC AG CSF QL: NEGATIVE
DEPRECATED RDW RBC AUTO: 42.3 FL (ref 37–54)
EGFRCR SERPLBLD CKD-EPI 2021: 125.7 ML/MIN/1.73
EOSINOPHIL # BLD MANUAL: 0.1 10*3/MM3 (ref 0–0.4)
EOSINOPHIL NFR BLD MANUAL: 1 % (ref 0.3–6.2)
ERYTHROCYTE [DISTWIDTH] IN BLOOD BY AUTOMATED COUNT: 13 % (ref 12.3–15.4)
GLOBULIN UR ELPH-MCNC: 2.4 GM/DL
GLUCOSE BLDC GLUCOMTR-MCNC: 143 MG/DL (ref 70–130)
GLUCOSE BLDC GLUCOMTR-MCNC: 91 MG/DL (ref 70–130)
GLUCOSE BLDC GLUCOMTR-MCNC: 93 MG/DL (ref 70–130)
GLUCOSE SERPL-MCNC: 93 MG/DL (ref 65–99)
HBV CORE IGM SERPL QL IA: NORMAL
HBV SURFACE AB SER RIA-ACNC: NORMAL
HBV SURFACE AG SERPL QL IA: NORMAL
HCT VFR BLD AUTO: 45.8 % (ref 37.5–51)
HCV AB SER DONR QL: NORMAL
HGB BLD-MCNC: 15.6 G/DL (ref 13–17.7)
HIV 1+2 AB+HIV1 P24 AG SERPL QL IA: REACTIVE
LYMPHOCYTES # BLD MANUAL: 2.54 10*3/MM3 (ref 0.7–3.1)
LYMPHOCYTES NFR BLD MANUAL: 6 % (ref 5–12)
MCH RBC QN AUTO: 30.1 PG (ref 26.6–33)
MCHC RBC AUTO-ENTMCNC: 34.1 G/DL (ref 31.5–35.7)
MCV RBC AUTO: 88.2 FL (ref 79–97)
MONOCYTES # BLD: 0.61 10*3/MM3 (ref 0.1–0.9)
NEUTROPHILS # BLD AUTO: 6.91 10*3/MM3 (ref 1.7–7)
NEUTROPHILS NFR BLD MANUAL: 68 % (ref 42.7–76)
PLAT MORPH BLD: NORMAL
PLATELET # BLD AUTO: 45 10*3/MM3 (ref 140–450)
PMV BLD AUTO: 13 FL (ref 6–12)
POTASSIUM SERPL-SCNC: 3.7 MMOL/L (ref 3.5–5.2)
PROT SERPL-MCNC: 6.9 G/DL (ref 6–8.5)
RBC # BLD AUTO: 5.19 10*6/MM3 (ref 4.14–5.8)
RBC MORPH BLD: NORMAL
RPR SER QL: REACTIVE
RPR SER-TITR: ABNORMAL {TITER}
SMUDGE CELLS BLD QL SMEAR: NORMAL
SODIUM SERPL-SCNC: 139 MMOL/L (ref 136–145)
VARIANT LYMPHS NFR BLD MANUAL: 25 % (ref 19.6–45.3)
WBC NRBC COR # BLD AUTO: 10.16 10*3/MM3 (ref 3.4–10.8)

## 2024-03-11 PROCEDURE — 85007 BL SMEAR W/DIFF WBC COUNT: CPT | Performed by: INTERNAL MEDICINE

## 2024-03-11 PROCEDURE — 86361 T CELL ABSOLUTE COUNT: CPT | Performed by: INTERNAL MEDICINE

## 2024-03-11 PROCEDURE — 99232 SBSQ HOSP IP/OBS MODERATE 35: CPT | Performed by: INTERNAL MEDICINE

## 2024-03-11 PROCEDURE — 63710000001 PREDNISONE PER 5 MG: Performed by: INTERNAL MEDICINE

## 2024-03-11 PROCEDURE — 87491 CHLMYD TRACH DNA AMP PROBE: CPT | Performed by: INTERNAL MEDICINE

## 2024-03-11 PROCEDURE — 86706 HEP B SURFACE ANTIBODY: CPT | Performed by: INTERNAL MEDICINE

## 2024-03-11 PROCEDURE — 63710000001 PREDNISONE PER 1 MG: Performed by: INTERNAL MEDICINE

## 2024-03-11 PROCEDURE — 87899 AGENT NOS ASSAY W/OPTIC: CPT | Performed by: INTERNAL MEDICINE

## 2024-03-11 PROCEDURE — 86593 SYPHILIS TEST NON-TREP QUANT: CPT | Performed by: INTERNAL MEDICINE

## 2024-03-11 PROCEDURE — 87591 N.GONORRHOEAE DNA AMP PROB: CPT | Performed by: INTERNAL MEDICINE

## 2024-03-11 PROCEDURE — 80053 COMPREHEN METABOLIC PANEL: CPT | Performed by: INTERNAL MEDICINE

## 2024-03-11 PROCEDURE — 87340 HEPATITIS B SURFACE AG IA: CPT | Performed by: INTERNAL MEDICINE

## 2024-03-11 PROCEDURE — 86777 TOXOPLASMA ANTIBODY: CPT | Performed by: INTERNAL MEDICINE

## 2024-03-11 PROCEDURE — 86803 HEPATITIS C AB TEST: CPT | Performed by: INTERNAL MEDICINE

## 2024-03-11 PROCEDURE — 85027 COMPLETE CBC AUTOMATED: CPT | Performed by: INTERNAL MEDICINE

## 2024-03-11 PROCEDURE — 86702 HIV-2 ANTIBODY: CPT | Performed by: INTERNAL MEDICINE

## 2024-03-11 PROCEDURE — 99223 1ST HOSP IP/OBS HIGH 75: CPT | Performed by: INTERNAL MEDICINE

## 2024-03-11 PROCEDURE — 81381 HLA I TYPING 1 ALLELE HR: CPT | Performed by: INTERNAL MEDICINE

## 2024-03-11 PROCEDURE — 87661 TRICHOMONAS VAGINALIS AMPLIF: CPT | Performed by: INTERNAL MEDICINE

## 2024-03-11 PROCEDURE — G0432 EIA HIV-1/HIV-2 SCREEN: HCPCS | Performed by: INTERNAL MEDICINE

## 2024-03-11 PROCEDURE — 86592 SYPHILIS TEST NON-TREP QUAL: CPT | Performed by: INTERNAL MEDICINE

## 2024-03-11 PROCEDURE — 86708 HEPATITIS A ANTIBODY: CPT | Performed by: INTERNAL MEDICINE

## 2024-03-11 PROCEDURE — 86701 HIV-1ANTIBODY: CPT | Performed by: INTERNAL MEDICINE

## 2024-03-11 PROCEDURE — 82948 REAGENT STRIP/BLOOD GLUCOSE: CPT

## 2024-03-11 PROCEDURE — 86778 TOXOPLASMA ANTIBODY IGM: CPT | Performed by: INTERNAL MEDICINE

## 2024-03-11 PROCEDURE — 86780 TREPONEMA PALLIDUM: CPT | Performed by: INTERNAL MEDICINE

## 2024-03-11 PROCEDURE — 87536 HIV-1 QUANT&REVRSE TRNSCRPJ: CPT | Performed by: INTERNAL MEDICINE

## 2024-03-11 RX ORDER — CHOLECALCIFEROL (VITAMIN D3) 125 MCG
1000 CAPSULE ORAL DAILY
Status: DISCONTINUED | OUTPATIENT
Start: 2024-03-11 | End: 2024-03-12 | Stop reason: HOSPADM

## 2024-03-11 RX ADMIN — PREDNISONE 70 MG: 20 TABLET ORAL at 08:14

## 2024-03-11 RX ADMIN — Medication 1000 MCG: at 16:39

## 2024-03-11 NOTE — PLAN OF CARE
Goal Outcome Evaluation:  Plan of Care Reviewed With: patient           Outcome Evaluation: Monitoring platelets, monitor for bleeding, VSS.

## 2024-03-11 NOTE — PROGRESS NOTES
Name: Choco [place] ADMIT: 3/9/2024   : 2000  PCP: Bertha Riggs MD    MRN: [id number] LOS: 1 days   AGE/SEX: 23 y.o. male  ROOM: [facility]     Subjective   Subjective   Denies any complaint. No chest pain or shortness of breath. No new bruises and no bleeding.     Objective   Objective   Vital Signs  Temp:  [98.1 °F (36.7 °C)-98.6 °F (37 °C)] 98.1 °F (36.7 °C)  Heart Rate:  [] 97  Resp:  [16-18] 18  BP: (107-130)/(63-76) 111/63  SpO2:  [97 %-100 %] 98 %  on   ;   Device (Oxygen Therapy): room air  Body mass index is 22.32 kg/m².    Physical Exam  Constitutional:       General: He is not in acute distress.     Appearance: Normal appearance. He is not toxic-appearing.   HENT:      Head: Normocephalic and atraumatic.   Cardiovascular:      Rate and Rhythm: Normal rate and regular rhythm.   Pulmonary:      Effort: Pulmonary effort is normal. No respiratory distress.      Breath sounds: Normal breath sounds. No wheezing or rhonchi.   Abdominal:      General: Bowel sounds are normal.      Palpations: Abdomen is soft.      Tenderness: There is no abdominal tenderness. There is no guarding or rebound.   Musculoskeletal:         General: No swelling.      Right lower leg: No edema.      Left lower leg: No edema.   Skin:     General: Skin is warm and dry.      Findings: Bruising present.   Neurological:      General: No focal deficit present.      Mental Status: He is alert and oriented to person, place, and time.   Psychiatric:         Mood and Affect: Mood normal.         Behavior: Behavior normal.   Results Review  I reviewed the patient's new clinical results.  Results from last 7 days   Lab Units 24  0717 03/10/24  0624 24  1702   WBC 10*3/mm3 10.16 7.66 5.44   HEMOGLOBIN g/dL 15.6 15.3 14.5   PLATELETS 10*3/mm3 45* 23*  23* 14*     Results from last 7 days   Lab Units 24  0717 03/10/24  0623 24  1743   SODIUM mmol/L 139 138 139   POTASSIUM mmol/L 3.7 4.2 4.0   CHLORIDE mmol/L  103 105 105   CO2 mmol/L 25.8 22.0 24.2   BUN mg/dL 19 12 15   CREATININE mg/dL 0.84 0.78 0.96   GLUCOSE mg/dL 93 134* 105*     Lab Results   Component Value Date    ANIONGAP 10.2 03/11/2024     Estimated Creatinine Clearance: 140.4 mL/min (by C-G formula based on SCr of 0.84 mg/dL).   Lab Results   Component Value Date    EGFR 125.7 03/11/2024     Results from last 7 days   Lab Units 03/11/24  0717 03/09/24  1743   ALBUMIN g/dL 4.5 4.4   BILIRUBIN mg/dL 0.5 0.3   ALK PHOS U/L 55 54   AST (SGOT) U/L 21 27   ALT (SGPT) U/L 30 28     Results from last 7 days   Lab Units 03/11/24  0717 03/10/24  0623 03/09/24  1743   CALCIUM mg/dL 9.6 9.8 8.8   ALBUMIN g/dL 4.5  --  4.4       Glucose   Date/Time Value Ref Range Status   03/11/2024 1117 93 70 - 130 mg/dL Final   03/11/2024 0615 91 70 - 130 mg/dL Final   03/10/2024 2125 147 (H) 70 - 130 mg/dL Final   03/10/2024 1604 123 70 - 130 mg/dL Final   03/10/2024 1134 134 (H) 70 - 130 mg/dL Final   03/10/2024 0558 124 70 - 130 mg/dL Final   03/09/2024 2041 128 70 - 130 mg/dL Final       No radiology results for the last day    Scheduled Meds  insulin lispro, 2-7 Units, Subcutaneous, 4x Daily AC & at Bedtime  predniSONE, 70 mg, Oral, Daily  vitamin B-12, 1,000 mcg, Oral, Daily    Continuous Infusions   PRN Meds    acetaminophen **OR** acetaminophen **OR** acetaminophen    senna-docusate sodium **AND** polyethylene glycol **AND** bisacodyl **AND** bisacodyl    dextrose    dextrose    glucagon (human recombinant)    ondansetron    sodium chloride     Diet  Diet: Regular/House; Fluid Consistency: Thin (IDDSI 0)    I have personally reviewed:  [x]  Medications  [x]  Laboratory   []  Microbiology   []  Radiology   [x]  EKG/Telemetry sinus on telemetry  []  Cardiology/Vascular   []  Pathology   []  Records      Assessment/Plan     Active Hospital Problems    Diagnosis  POA    **Acute ITP [D69.3]  Yes    Hyperglycemia [R73.9]  Unknown      Resolved Hospital Problems   No resolved problems  to display.     23 y.o. male with easy bruising and thrombocytopenia    Thrombocytopenia  Steroids for probably ITP  Hematology evaluation/workup    HIV  appreciate ID eval and recs    Hyperglycemia  Probably from steroids currently on correctional insulin- it is mild I think we can just monitor (he has not required any correctional insulin)    Discharge  TBD  Expected discharge date/ time has not been documented.    Discussed with patient and nursing staff    Toby Parker MD  [facility]  03/11/24

## 2024-03-11 NOTE — CONSULTS
"Referring Provider: Dr Haddad    Reason for Consultation: HIV positive    History of present illness:  Choco [place] is a 23 y.o. who I am asked to evaluate and give opinion for \"HIV positive.\" History is obtained from the patient and review of the old medical records which I summarize/synthesize as follows: He presented to the emergency room on 3/9/2024 with about 2 weeks of bruising.  He was not having any active bleeding.  He had labs done as an outpatient that showed severe thrombocytopenia so he was directed to the emergency room for further evaluation.  There were concerns for ITP so he was started on steroids.  An HIV test returned positive so infectious diseases has been consulted.    He says that he has been tested for HIV in the past and it was negative.  He does report Marol sexual partners.  No intravenous drug use.  No history of other sexually transmitted infections.  He says he is otherwise healthy and takes no medications.    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      Antibiotic allergies and intolerances:  None    Medications:    Current Facility-Administered Medications:     acetaminophen (TYLENOL) tablet 650 mg, 650 mg, Oral, Q4H PRN **OR** acetaminophen (TYLENOL) 160 MG/5ML oral solution 650 mg, 650 mg, Oral, Q4H PRN **OR** acetaminophen (TYLENOL) suppository 650 mg, 650 mg, Rectal, Q4H PRN, Mera Rios MD    sennosides-docusate (PERICOLACE) 8.6-50 MG per tablet 2 tablet, 2 tablet, Oral, BID PRN **AND** polyethylene glycol (MIRALAX) packet 17 g, 17 g, Oral, Daily PRN **AND** bisacodyl (DULCOLAX) EC tablet 5 mg, 5 mg, Oral, Daily PRN **AND** bisacodyl (DULCOLAX) suppository 10 mg, 10 mg, Rectal, Daily PRN, Mera Rios MD    dextrose (D50W) (25 g/50 mL) IV injection 25 g, 25 g, Intravenous, Q15 Min PRN, Mera Rios MD    dextrose (GLUTOSE) oral gel 15 g, 15 g, Oral, Q15 Min PRN, Mera Rios MD    glucagon (GLUCAGEN) " injection 1 mg, 1 mg, Intramuscular, Q15 Min PRN, Mera Rios MD    insulin lispro (HUMALOG/ADMELOG) injection 2-7 Units, 2-7 Units, Subcutaneous, 4x Daily AC & at Bedtime, Mera Rios MD    ondansetron (ZOFRAN) injection 4 mg, 4 mg, Intravenous, Q6H PRN, Mera Rios MD    predniSONE (DELTASONE) tablet 70 mg, 70 mg, Oral, Daily, Susannah Gr MD, 70 mg at 03/11/24 0814    sodium chloride 0.9 % flush 10 mL, 10 mL, Intravenous, PRN, Emergency, Triage Protocol, MD    vitamin B-12 (CYANOCOBALAMIN) tablet 1,000 mcg, 1,000 mcg, Oral, Daily, Vonda Haddad MD      Objective   Vital Signs   Temp:  [98.1 °F (36.7 °C)-98.6 °F (37 °C)] 98.1 °F (36.7 °C)  Heart Rate:  [] 82  Resp:  [16-18] 18  BP: (107-130)/(68-76) 111/73    Physical Exam:   General: awake, alert, NAD, very nice  Eyes: no scleral icterus  ENT: no thrush  Cardiovascular: NR  Respiratory: normal work of breathing on ambient air  GI: Abdomen is soft, not tender, + bowel sounds in all four quadrants  Neurological: Alert and oriented x 3  Psychiatric: Normal mood and affect   Vasc: PIV w/o erythema    Labs:     Lab Results   Component Value Date    WBC 10.16 03/11/2024    HGB 15.6 03/11/2024    HCT 45.8 03/11/2024    MCV 88.2 03/11/2024    PLT 45 (C) 03/11/2024       Lab Results   Component Value Date    GLUCOSE 93 03/11/2024    BUN 19 03/11/2024    CREATININE 0.84 03/11/2024    BCR 22.6 03/11/2024    CO2 25.8 03/11/2024    CALCIUM 9.6 03/11/2024    ALBUMIN 4.5 03/11/2024    AST 21 03/11/2024    ALT 30 03/11/2024     HIV antibody positive  Hep C antibody negative  Hep B surface antigen nonreactive  Hep B surface antibody nonreactive  TEZ pending    Radiology:  None    ASSESSMENT/PLAN:  New diagnosis HIV -antibody positive  Thrombocytopenia due to #1 versus ITP    Given risk factor of MSM, I suspect his positive HIV antibody test is a true result.  However, I will check an HIV RNA to be sure.  If this results as positive  then I will plan to start him on Biktarvy 1 tablet daily.  We discussed that HIV is a treatable, chronic illness and that he should have a nearly normal life expectancy if he is adherent with his medication and clinic visits.    I will check additional labs including CD4 count, immunity to hepatitis A, B, and C, RPR, gonorrhea and chlamydia test, cryptococcus antigen, HLA , and Toxoplasma antibodies.    I will plan to have him follow-up w/ me after discharge from the hospital for ongoing HIV care.     I asked his dad to leave the room as we discussed the new diagnosis; however, he tells me he plans to disclose his diagnosis to his dad later this afternoon.     ID will follow.

## 2024-03-11 NOTE — PROGRESS NOTES
REASON FOR FOLLOWUP/CHIEF COMPLAINT:   ITP  HIV positive     HISTORY OF PRESENT ILLNESS:   Patient reports no new complaints at this time.  Platelets have improved to 45,000.  I was called by his nurse this morning to inform that the HIV DO has returned reactive.  HIV panel has been ordered and results are pending at this time.  Hepatitis B surface antigen nonreactive, hepatitis B Surface antibody nonreactive, hepatitis C antibody nonreactive  Platelets have improved to 45,000.  IPF elevated at 20.1 on 3/10/2024.    Past Medical History, Past Surgical History, Social History, Family History have been reviewed and are without significant changes except as mentioned.    Review of Systems   Review of Systems  Review of systems as mentioned HPI otherwise negative    Medications:  The current medication list was reviewed in the EMR    ALLERGIES:  No Known Allergies           Vitals:    03/10/24 2003 03/11/24 0009 03/11/24 0422 03/11/24 0710   BP: 130/73 127/72 107/68 111/73   BP Location: Right arm Right arm Right arm Right arm   Patient Position: Lying Lying Lying Lying   Pulse: 106 76 57 82   Resp: 18 18 18 18   Temp: 98.6 °F (37 °C) 98.4 °F (36.9 °C) 98.4 °F (36.9 °C) 98.1 °F (36.7 °C)   TempSrc: Oral Oral Oral Oral   SpO2: 99% 100% 97% 100%   Weight:       Height:         Physical Exam    CONSTITUTIONAL:  Vital signs reviewed.  No distress, looks comfortable.  EYES:  Conjunctivae and lids unremarkable.  PERRLA  EARS,NOSE,MOUTH,THROAT:  Ears and nose appear unremarkable.  Lips, teeth, gums appear unremarkable.  RESPIRATORY:  Normal respiratory effort.  Lungs clear to auscultation bilaterally.  CARDIOVASCULAR:  Normal S1, S2.  No murmurs rubs or gallops.  No significant lower extremity edema.  GASTROINTESTINAL: Abdomen appears unremarkable.  Nontender.  No hepatomegaly.  No splenomegaly.  NEURO: cranial nerves 2-12 grossly intact.  No focal deficits.  Appears to have equal strength all 4  extremities.  MUSCULOSKELETAL:  Unremarkable digits/nails.  No cyanosis or clubbing.  SKIN:  Warm.  No rashes.  PSYCHIATRIC:  Normal judgment and insight.  Normal mood and affect.       RECENT LABS:  WBC   Date Value Ref Range Status   03/11/2024 10.16 3.40 - 10.80 10*3/mm3 Final   03/10/2024 7.66 3.40 - 10.80 10*3/mm3 Final   03/09/2024 5.44 3.40 - 10.80 10*3/mm3 Final     Hemoglobin   Date Value Ref Range Status   03/11/2024 15.6 13.0 - 17.7 g/dL Final   03/10/2024 15.3 13.0 - 17.7 g/dL Final   03/09/2024 14.5 13.0 - 17.7 g/dL Final     Platelets   Date Value Ref Range Status   03/11/2024 45 (C) 140 - 450 10*3/mm3 Final   03/10/2024 23 (C) 140 - 450 10*3/mm3 Final   03/10/2024 23 (C) 140 - 450 10*3/mm3 Final   03/09/2024 14 (C) 140 - 450 10*3/mm3 Final       ASSESSMENT/PLAN:  [facility] S511/1     *ITP  Platelet count 14,000 on presentation with normal WBC count as well as hemoglobin.  Started on prednisone 1 mg/kg, 70 mg daily.  Platelets have responded well and have improved to 45,000  Tested positive for HIV.  HIV panel pending.  Likely etiology for ITP  Continue prednisone  TEZ pending  B12 borderline at 360  Start oral vitamin B12 replacement    *HIV positive  HIV panel pending  Consult ID  Hepatitis B and hepatitis C testing negative      Plan  Continue prednisone  Consult ID  Start oral vitamin B12

## 2024-03-12 VITALS
HEART RATE: 66 BPM | RESPIRATION RATE: 16 BRPM | SYSTOLIC BLOOD PRESSURE: 110 MMHG | TEMPERATURE: 98.6 F | DIASTOLIC BLOOD PRESSURE: 62 MMHG | WEIGHT: 160 LBS | OXYGEN SATURATION: 100 % | HEIGHT: 71 IN | BODY MASS INDEX: 22.4 KG/M2

## 2024-03-12 PROBLEM — Z21 HIV TEST POSITIVE: Status: ACTIVE | Noted: 2024-03-12

## 2024-03-12 PROBLEM — A53.0 POSITIVE RPR TEST: Status: ACTIVE | Noted: 2024-03-12

## 2024-03-12 LAB
ANION GAP SERPL CALCULATED.3IONS-SCNC: 9 MMOL/L (ref 5–15)
BASOPHILS # BLD AUTO: 0 X10E3/UL (ref 0–0.2)
BASOPHILS # BLD AUTO: 0.03 10*3/MM3 (ref 0–0.2)
BASOPHILS NFR BLD AUTO: 0 %
BASOPHILS NFR BLD AUTO: 0.3 % (ref 0–1.5)
BUN SERPL-MCNC: 19 MG/DL (ref 6–20)
BUN/CREAT SERPL: 26 (ref 7–25)
CALCIUM SPEC-SCNC: 9.3 MG/DL (ref 8.6–10.5)
CD3+CD4+ CELLS # BLD: 274 /UL (ref 359–1519)
CD3+CD4+ CELLS NFR BLD: 21.1 % (ref 30.8–58.5)
CENTROMERE B AB SER-ACNC: <0.2 AI (ref 0–0.9)
CHLORIDE SERPL-SCNC: 104 MMOL/L (ref 98–107)
CHROMATIN AB SERPL-ACNC: <0.2 AI (ref 0–0.9)
CO2 SERPL-SCNC: 26 MMOL/L (ref 22–29)
CREAT SERPL-MCNC: 0.73 MG/DL (ref 0.76–1.27)
DEPRECATED RDW RBC AUTO: 41.8 FL (ref 37–54)
DSDNA AB SER-ACNC: <1 IU/ML (ref 0–9)
EGFRCR SERPLBLD CKD-EPI 2021: 131.1 ML/MIN/1.73
ENA JO1 AB SER-ACNC: <0.2 AI (ref 0–0.9)
ENA RNP AB SER-ACNC: <0.2 AI (ref 0–0.9)
ENA SCL70 AB SER-ACNC: <0.2 AI (ref 0–0.9)
ENA SM AB SER-ACNC: <0.2 AI (ref 0–0.9)
ENA SS-A AB SER-ACNC: <0.2 AI (ref 0–0.9)
ENA SS-B AB SER-ACNC: <0.2 AI (ref 0–0.9)
EOSINOPHIL # BLD AUTO: 0 X10E3/UL (ref 0–0.4)
EOSINOPHIL # BLD AUTO: 0.01 10*3/MM3 (ref 0–0.4)
EOSINOPHIL NFR BLD AUTO: 0 %
EOSINOPHIL NFR BLD AUTO: 0.1 % (ref 0.3–6.2)
ERYTHROCYTE [DISTWIDTH] IN BLOOD BY AUTOMATED COUNT: 12.7 % (ref 11.6–15.4)
ERYTHROCYTE [DISTWIDTH] IN BLOOD BY AUTOMATED COUNT: 12.7 % (ref 12.3–15.4)
FOLATE BLD-MCNC: 367 NG/ML
FOLATE RBC-MCNC: 779 NG/ML
GLUCOSE SERPL-MCNC: 96 MG/DL (ref 65–99)
HAV AB SER QL IA: POSITIVE
HCT VFR BLD AUTO: 41.9 % (ref 37.5–51)
HCT VFR BLD AUTO: 46 % (ref 37.5–51)
HCT VFR BLD AUTO: 47.1 % (ref 37.5–51)
HGB BLD-MCNC: 14.5 G/DL (ref 13–17.7)
HGB BLD-MCNC: 16.1 G/DL (ref 13–17.7)
HIV 1 & 2 AB SERPLBLD IA.RAPID: ABNORMAL
HIV 2 AB SERPLBLD QL IA.RAPID: NON REACTIVE
HIV1 AB SERPLBLD QL IA.RAPID: REACTIVE
HIV1 RNA # SERPL NAA+PROBE: NORMAL COPIES/ML
HIV1 RNA SERPL NAA+PROBE-LOG#: 4.92 LOG10COPY/ML
IMM GRANULOCYTES # BLD AUTO: 0 X10E3/UL (ref 0–0.1)
IMM GRANULOCYTES # BLD AUTO: 0.05 10*3/MM3 (ref 0–0.05)
IMM GRANULOCYTES NFR BLD AUTO: 0.4 % (ref 0–0.5)
IMM GRANULOCYTES NFR BLD AUTO: 1 %
LABORATORY COMMENT REPORT: NORMAL
LYMPHOCYTES # BLD AUTO: 1.3 X10E3/UL (ref 0.7–3.1)
LYMPHOCYTES # BLD AUTO: 3.3 10*3/MM3 (ref 0.7–3.1)
LYMPHOCYTES NFR BLD AUTO: 17 %
LYMPHOCYTES NFR BLD AUTO: 28.6 % (ref 19.6–45.3)
Lab: NORMAL
MCH RBC QN AUTO: 30.7 PG (ref 26.6–33)
MCH RBC QN AUTO: 30.7 PG (ref 26.6–33)
MCHC RBC AUTO-ENTMCNC: 34.6 G/DL (ref 31.5–35.7)
MCHC RBC AUTO-ENTMCNC: 35 G/DL (ref 31.5–35.7)
MCV RBC AUTO: 88 FL (ref 79–97)
MCV RBC AUTO: 88.8 FL (ref 79–97)
MONOCYTES # BLD AUTO: 0.2 X10E3/UL (ref 0.1–0.9)
MONOCYTES # BLD AUTO: 1.04 10*3/MM3 (ref 0.1–0.9)
MONOCYTES NFR BLD AUTO: 2 %
MONOCYTES NFR BLD AUTO: 9 % (ref 5–12)
MORPHOLOGY BLD-IMP: ABNORMAL
NEUTROPHILS # BLD AUTO: 6.1 X10E3/UL (ref 1.4–7)
NEUTROPHILS NFR BLD AUTO: 61.6 % (ref 42.7–76)
NEUTROPHILS NFR BLD AUTO: 7.11 10*3/MM3 (ref 1.7–7)
NEUTROPHILS NFR BLD AUTO: 80 %
NRBC BLD AUTO-RTO: 0 /100 WBC (ref 0–0.2)
PLATELET # BLD AUTO: 60 X10E3/UL (ref 150–450)
PLATELET # BLD AUTO: 62 10*3/MM3 (ref 140–450)
PMV BLD AUTO: 12.5 FL (ref 6–12)
POTASSIUM SERPL-SCNC: 3.6 MMOL/L (ref 3.5–5.2)
RBC # BLD AUTO: 4.72 10*6/MM3 (ref 4.14–5.8)
RBC # BLD AUTO: 5.25 X10E6/UL (ref 4.14–5.8)
SODIUM SERPL-SCNC: 139 MMOL/L (ref 136–145)
T GONDII IGG SERPL IA-ACNC: <3 IU/ML (ref 0–7.1)
T GONDII IGM SER IA-ACNC: <3 AU/ML (ref 0–7.9)
TREPONEMA PALLIDUM IGG+IGM AB [PRESENCE] IN SERUM OR PLASMA BY IMMUNOASSAY: REACTIVE
WBC # BLD AUTO: 7.6 X10E3/UL (ref 3.4–10.8)
WBC NRBC COR # BLD AUTO: 11.54 10*3/MM3 (ref 3.4–10.8)

## 2024-03-12 PROCEDURE — 99232 SBSQ HOSP IP/OBS MODERATE 35: CPT | Performed by: INTERNAL MEDICINE

## 2024-03-12 PROCEDURE — 80048 BASIC METABOLIC PNL TOTAL CA: CPT | Performed by: HOSPITALIST

## 2024-03-12 PROCEDURE — 63710000001 PREDNISONE PER 1 MG: Performed by: INTERNAL MEDICINE

## 2024-03-12 PROCEDURE — 85025 COMPLETE CBC W/AUTO DIFF WBC: CPT | Performed by: INTERNAL MEDICINE

## 2024-03-12 PROCEDURE — 63710000001 PREDNISONE PER 5 MG: Performed by: INTERNAL MEDICINE

## 2024-03-12 PROCEDURE — 25010000002 PENICILLIN G BENZATHINE PER 2400000 UNITS: Performed by: INTERNAL MEDICINE

## 2024-03-12 RX ORDER — FAMOTIDINE 40 MG/1
40 TABLET, FILM COATED ORAL 2 TIMES DAILY
Qty: 60 TABLET | Refills: 0 | Status: SHIPPED | OUTPATIENT
Start: 2024-03-12

## 2024-03-12 RX ORDER — PREDNISONE 10 MG/1
70 TABLET ORAL DAILY
Qty: 70 TABLET | Refills: 0 | Status: SHIPPED | OUTPATIENT
Start: 2024-03-13 | End: 2024-03-23

## 2024-03-12 RX ADMIN — Medication 1000 MCG: at 08:54

## 2024-03-12 RX ADMIN — PREDNISONE 70 MG: 20 TABLET ORAL at 08:54

## 2024-03-12 RX ADMIN — PENICILLIN G BENZATHINE 2.4 MILLION UNITS: 2400000 INJECTION, SUSPENSION INTRAMUSCULAR at 12:29

## 2024-03-12 NOTE — PROGRESS NOTES
REASON FOR FOLLOWUP/CHIEF COMPLAINT:   ITP  HIV positive     HISTORY OF PRESENT ILLNESS:   Patient reports no new complaints this morning.  He is eager to be discharged home.  No EXTR bleeding or bruising  Platelet count has improved to 62,000.  Evaluated by infectious diseases and plan is to treat the HIV as well as syphilis.  He has been started on penicillin.      Past Medical History, Past Surgical History, Social History, Family History have been reviewed and are without significant changes except as mentioned.    Review of Systems   Review of Systems  Review of systems as mentioned in the HPI otherwise negative    Medications:  The current medication list was reviewed in the EMR    ALLERGIES:  No Known Allergies           Vitals:    03/11/24 1558 03/11/24 2030 03/12/24 0415 03/12/24 0754   BP: 134/70 118/63 104/52    BP Location: Right arm Right arm Right arm    Patient Position: Sitting Lying Lying    Pulse: 67 76 63    Resp: 16 16 16    Temp: 98.2 °F (36.8 °C) 98.7 °F (37.1 °C) 97 °F (36.1 °C) Comment: pt refused vitals at this time   TempSrc: Oral Oral Oral    SpO2: 98% 99% 99%    Weight:       Height:         Physical Exam    CONSTITUTIONAL:  Vital signs reviewed.  No distress, looks comfortable.  EYES:  Conjunctivae and lids unremarkable.  PERRLA  EARS,NOSE,MOUTH,THROAT:  Ears and nose appear unremarkable.  Lips, teeth, gums appear unremarkable.  RESPIRATORY:  Normal respiratory effort.  Lungs clear to auscultation bilaterally.  CARDIOVASCULAR:  Normal S1, S2.  No murmurs rubs or gallops.  No significant lower extremity edema.  GASTROINTESTINAL: Abdomen appears unremarkable.  Nontender.  No hepatomegaly.  No splenomegaly.  NEURO: cranial nerves 2-12 grossly intact.  No focal deficits.  Appears to have equal strength all 4 extremities.  MUSCULOSKELETAL:  Unremarkable digits/nails.  No cyanosis or clubbing.  SKIN:  Warm.  No rashes.  Multiple tattoos.  PSYCHIATRIC:  Normal judgment and insight.   Normal mood and affect.    I have reexamined the patient and the results are consistent with the previously documented exam. Vonda Haddad MD         RECENT LABS:  WBC   Date Value Ref Range Status   03/12/2024 11.54 (H) 3.40 - 10.80 10*3/mm3 Final   03/11/2024 10.16 3.40 - 10.80 10*3/mm3 Final   03/10/2024 7.66 3.40 - 10.80 10*3/mm3 Final   03/09/2024 5.44 3.40 - 10.80 10*3/mm3 Final     Hemoglobin   Date Value Ref Range Status   03/12/2024 14.5 13.0 - 17.7 g/dL Final   03/11/2024 15.6 13.0 - 17.7 g/dL Final   03/10/2024 15.3 13.0 - 17.7 g/dL Final   03/09/2024 14.5 13.0 - 17.7 g/dL Final     Platelets   Date Value Ref Range Status   03/12/2024 62 (L) 140 - 450 10*3/mm3 Final   03/11/2024 45 (C) 140 - 450 10*3/mm3 Final   03/10/2024 23 (C) 140 - 450 10*3/mm3 Final   03/10/2024 23 (C) 140 - 450 10*3/mm3 Final   03/09/2024 14 (C) 140 - 450 10*3/mm3 Final       ASSESSMENT/PLAN:  [facility] P390/1     *ITP  Platelet count 14,000 on presentation with normal WBC count as well as hemoglobin.  Started on prednisone 1 mg/kg, 70 mg daily.  Platelets further improved at 62,000  Tested positive for HIV.  HIV panel pending.  Likely etiology for ITP  Continue prednisone  TEZ pending  B12 borderline at 360  Continue oral vitamin B12  Patient can be discharged home and continue on oral prednisone 70 mg daily.  He will be scheduled to see Dr. Gr in the clinic next week and we will initiate steroid taper once the platelet counts have normalized.    *HIV positive  HIV panel pending  Evaluated by infectious disease and started on treatment for syphilis.  Patient now on penicillin.  Plans for follow-up as an outpatient for HIV care.  Hepatitis B and hepatitis C testing negative      Plan  Continue prednisone at current dose of 70 mg daily.  Patient will be scheduled to follow-up with Dr. Bhupalam next week in the clinic for steroid taper  He will need follow-up with infectious disease on discharge  Continue oral vitamin B12  on discharge    Discussed with Dr. Royal.  Cussed with

## 2024-03-12 NOTE — PROGRESS NOTES
ID NOTE    CC: f/u HIV positive and late latent syphilis    Subj: No acute events.  No new symptoms to report.  His platelets are up to the 60s today.  His RPR returned positive.      Medications:    Current Facility-Administered Medications:     acetaminophen (TYLENOL) tablet 650 mg, 650 mg, Oral, Q4H PRN **OR** acetaminophen (TYLENOL) 160 MG/5ML oral solution 650 mg, 650 mg, Oral, Q4H PRN **OR** acetaminophen (TYLENOL) suppository 650 mg, 650 mg, Rectal, Q4H PRN, Mera Rios MD    sennosides-docusate (PERICOLACE) 8.6-50 MG per tablet 2 tablet, 2 tablet, Oral, BID PRN **AND** polyethylene glycol (MIRALAX) packet 17 g, 17 g, Oral, Daily PRN **AND** bisacodyl (DULCOLAX) EC tablet 5 mg, 5 mg, Oral, Daily PRN **AND** bisacodyl (DULCOLAX) suppository 10 mg, 10 mg, Rectal, Daily PRN, Mera Rios MD    ondansetron (ZOFRAN) injection 4 mg, 4 mg, Intravenous, Q6H PRN, Mera Rios MD    Penicillin G Benzathine (BICILLIN-LA) injection 2.4 Million Units, 2.4 Million Units, Intramuscular, Once, Tony Royal MD    predniSONE (DELTASONE) tablet 70 mg, 70 mg, Oral, Daily, Susannah Gr MD, 70 mg at 03/12/24 0854    sodium chloride 0.9 % flush 10 mL, 10 mL, Intravenous, PRN, Emergency, Triage Protocol, MD    vitamin B-12 (CYANOCOBALAMIN) tablet 1,000 mcg, 1,000 mcg, Oral, Daily, Vonda Haddad MD, 1,000 mcg at 03/12/24 0854      Objective   Vital Signs   Temp:  [97 °F (36.1 °C)-98.7 °F (37.1 °C)] 97 °F (36.1 °C)  Heart Rate:  [62-97] 63  Resp:  [16-18] 16  BP: (104-134)/(52-70) 104/52    Physical Exam:   General: awake, alert, NAD  Eyes: no scleral icterus  ENT: no thrush  Cardiovascular: NR  Respiratory: normal work of breathing on RA  GI: Abdomen is soft, not tender or distended  Neurological: Alert and oriented x 3  Psychiatric: Normal mood and affect     Labs:     Lab Results   Component Value Date    WBC 11.54 (H) 03/12/2024    HGB 14.5 03/12/2024    HCT 41.9 03/12/2024     MCV 88.8 03/12/2024    PLT 62 (L) 03/12/2024       Lab Results   Component Value Date    GLUCOSE 96 03/12/2024    BUN 19 03/12/2024    CREATININE 0.73 (L) 03/12/2024    BCR 26.0 (H) 03/12/2024    CO2 26.0 03/12/2024    CALCIUM 9.3 03/12/2024    ALBUMIN 4.5 03/11/2024    AST 21 03/11/2024    ALT 30 03/11/2024     HIV antibody positive  HIV RNA pending  HIV Genotype pending  CD4 count pending  Hep A total antibody positive  Hep B surface antigen nonreactive  Hep B core IgM Ab non-reactive  Hep B surface antibody nonreactive  Hep C antibody negative  RPR 1:32; TPA-Abs pending  Urine GC/CT NAAT pending  Toxo Ab pending  Crypto Ag negative  ----------------------------------------------  TEZ pending    Radiology:  None    ASSESSMENT/PLAN:  New diagnosis HIV -antibody positive  Thrombocytopenia due to #1 versus ITP  Late latent syphilis    Re: new diagnosis of HIV, I am going to await the result of the HIV RNA to confirm the diagnosis before starting him on Biktarvy. I will schedule him to see me in my office next week 3/19/24. I will have the result back by that time.     RPR 1:32 and likely has new diagnosis of late-latent syphilis. I am going to give him bicillin LA 2.4 million units IM today. I have placed orders for him to get doses #2 and #3 in the ACU on 3/19/24 and 3/26/24, respectively, to complete the 3-dose course. Check RPR again in 6 months to confirm 4-fold decline in titer.     No objection to DC from my standpoint but will see again tomorrow if still here.     D/w Dr Haddad.

## 2024-03-12 NOTE — DISCHARGE SUMMARY
[facility]    Date of Discharge:  3/12/2024    PCP: Bertha Riggs MD    Discharge Diagnosis:   Active Hospital Problems    Diagnosis  POA    **Acute ITP [D69.3]  Yes    HIV test positive [Z21]  Unknown    Positive RPR test [A53.0]  Unknown    Hyperglycemia [R73.9]  Unknown      Resolved Hospital Problems   No resolved problems to display.          Consults       Date and Time Order Name Status Description    3/11/2024 11:47 AM Inpatient Infectious Diseases Consult Completed     3/9/2024  6:07 PM Inpatient Hematology & Oncology Consult            Hospital Course  23 y.o. male presented with easy bruising and found to be thrombocytopenic. Platelets on admission were 23,000. Presumptive diagnosis was made ITP and he was started on steroids and platelets today are 62,000. He was newly diagnosed with HIV antibody was positive. HIV RNA is pending to confirm and ID is going to follow-up with him in a week and likely start him on Biktarvy. He also was newly diagnosed with late latent syphilis and was given bicillin LA 2.4 million units IM today. ID is planning two more doses in the ACU to complete 3 dose course with a repeat RPR in 6 months. Hepatitis A antibody was also positive. Hematology recommends 70 mg prednisone daily and they will follow-up in a week to taper. He had low normal B12 and started on replacement. Today he is without new complaints. No new bruising or bleeding. He would like to go home today.    I discussed the patient's findings and my recommendations with patient and nursing staff and Dr. Royal and Dr. Haddad.    Temp:  [97 °F (36.1 °C)-98.7 °F (37.1 °C)] 98.6 °F (37 °C)  Heart Rate:  [62-76] 66  Resp:  [16] 16  BP: (104-134)/(52-70) 110/62  Body mass index is 22.32 kg/m².    Physical Exam  Constitutional:       General: He is not in acute distress.     Appearance: Normal appearance. He is not toxic-appearing.   HENT:      Head:  Normocephalic and atraumatic.   Cardiovascular:      Rate and Rhythm: Normal rate and regular rhythm.   Pulmonary:      Effort: Pulmonary effort is normal. No respiratory distress.   Abdominal:      General: Bowel sounds are normal.      Palpations: Abdomen is soft.      Tenderness: There is no abdominal tenderness.   Musculoskeletal:         General: No swelling.   Skin:     General: Skin is warm and dry.   Neurological:      General: No focal deficit present.      Mental Status: He is alert and oriented to person, place, and time.   Psychiatric:         Mood and Affect: Mood normal.         Behavior: Behavior normal.     Disposition: Home or Self Care       Discharge Medications        New Medications        Instructions Start Date   cyanocobalamin 1000 MCG tablet  Commonly known as: VITAMIN B-12   1,000 mcg, Oral, Daily   Start Date: March 13, 2024     famotidine 40 MG tablet  Commonly known as: Pepcid   40 mg, Oral, 2 Times Daily      predniSONE 10 MG tablet  Commonly known as: DELTASONE   70 mg, Oral, Daily   Start Date: March 13, 2024             Diet Instructions       Diet: Regular/House Diet      Discharge Diet: Regular/House Diet    Texture: Regular Texture (IDDSI 7)    Fluid Consistency: Thin (IDDSI 0)    Diet: Regular/House Diet      Discharge Diet: Regular/House Diet    Texture: Regular Texture (IDDSI 7)    Fluid Consistency: Thin (IDDSI 0)           Activity Instructions       Activity as Tolerated      Activity as Tolerated             Additional Instructions for the Follow-ups that You Need to Schedule       Call MD for problems / concerns.   As directed      Call MD for problems / concerns.   As directed             Follow-up Information       Bertha Navarro MD .    Specialty: Internal Medicine  Contact information:  [address] [address] [phone]               Vonda Haddad MD. Schedule an appointment as soon as possible for a visit in 1 week(s).    Specialties:  Hematology and Oncology, Internal Medicine, Oncology  Contact information:  [address]  [facility] [address]  [place] [address]  [phone]               Tony Royal MD. Schedule an appointment as soon as possible for a visit in 1 week(s).    Specialty: Infectious Diseases  Contact information:  [address]  [facility] [address]  [place] [address]  [phone]                            Future Appointments   Date Time Provider Department Center   3/18/2024  7:30 AM LAB CHAIR 6 [facility]  [facility]   3/18/2024  8:00 AM Susannah Gr MD [facility]   3/19/2024 11:00 AM Tony Royal MD [facility]     Pending Labs       Order Current Status    TEZ Comprehensive Panel In process    Chlamydia trachomatis, Neisseria gonorrhoeae, Trichomonas vaginalis, PCR - Urine, Urine, Clean Catch In process    Folate RBC In process    HIV 1 GenoSure PRIme(R) In process    HIV Panel 192578 In process    HIV RNA Real Time PCR (Non-Graph) In process    HLA  Test In process    T-helper Cells (CD4) Count In process    Treponema Pallidum, Confirmation In process           Toby Parker MD  [facility]  03/12/24    Discharge time spent greater than 30 minutes.

## 2024-03-12 NOTE — CASE MANAGEMENT/SOCIAL WORK
Discharge Planning Assessment  [facility]     Patient Name: Choco Rico  MRN: [id number]  Today's Date: 3/12/2024    Admit Date: 3/9/2024    Plan: Home with family to transport   Discharge Needs Assessment       Row Name 03/12/24 0905       Living Environment    People in Home parent(s)    Current Living Arrangements home    Potentially Unsafe Housing Conditions none    Primary Care Provided by self    Provides Primary Care For no one    Family Caregiver if Needed parent(s)    Family Caregiver Names Rony [phone]    Quality of Family Relationships helpful;involved;supportive    Able to Return to Prior Arrangements yes       Resource/Environmental Concerns    Resource/Environmental Concerns none    Transportation Concerns none       Transportation Needs    In the past 12 months, has lack of transportation kept you from medical appointments or from getting medications? no    In the past 12 months, has lack of transportation kept you from meetings, work, or from getting things needed for daily living? No       Food Insecurity    Within the past 12 months, you worried that your food would run out before you got the money to buy more. Never true    Within the past 12 months, the food you bought just didn't last and you didn't have money to get more. Never true       Transition Planning    Patient/Family Anticipates Transition to home with family    Patient/Family Anticipated Services at Transition none    Transportation Anticipated family or friend will provide       Discharge Needs Assessment    Readmission Within the Last 30 Days no previous admission in last 30 days    Equipment Currently Used at Home none    Concerns to be Addressed denies needs/concerns at this time;discharge planning    Anticipated Changes Related to Illness none    Equipment Needed After Discharge none                   Discharge Plan       Row Name 03/12/24 0904       Plan    Plan Home with family to transport    Roadmap to Recovery Yes     Patient/Family in Agreement with Plan yes    Provided Post Acute Provider List? N/A    N/A Provider List Comment Denies need    Provided Post Acute Provider Quality & Resource List? N/A    N/A Quality & Resource List Comment Denies need    Plan Comments Spoke with patient at bedside. Introduced self and explained CCP role. Verified face sheet and local pharmacy is [facility]. Patient denies difficulty with medication cost/ management. Patient lives with father in s/s home with 0 TIMOTHY. Patient denies prior HH, SNF, and DME history. Patient plans to return home with family to transport.                  Continued Care and Services - Admitted Since 3/9/2024    No active coordination exists for this encounter.       Expected Discharge Date and Time       Expected Discharge Date Expected Discharge Time    Mar 13, 2024            Demographic Summary       Row Name 03/12/24 0904       General Information    Admission Type inpatient    Referral Source admission list    Reason for Consult discharge planning    Preferred Language English                   Functional Status       Row Name 03/12/24 0904       Functional Status    Usual Activity Tolerance good    Current Activity Tolerance good       Functional Status, IADL    Medications independent    Meal Preparation independent    Housekeeping independent    Laundry independent    Shopping independent       Mental Status    General Appearance WDL WDL       Mental Status Summary    Recent Changes in Mental Status/Cognitive Functioning no changes       Employment/    Employment Status employed full-time                   Psychosocial    No documentation.                  Abuse/Neglect    No documentation.                  Legal    No documentation.                  Substance Abuse    No documentation.                  Patient Forms    No documentation.                     Erika Gallagher RN

## 2024-03-12 NOTE — PLAN OF CARE
Goal Outcome Evaluation:  Plan of Care Reviewed With: patient        Progress: no change  Outcome Evaluation: VSS, no c/o pain.

## 2024-03-12 NOTE — NURSING NOTE
143 RN paged and spoke with Dr Parker regarding job restrictions. 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 Per Dr Parker pt off worked until cleared back by Hematology, follow up appointment March 18. RN educated pt and provided a work note.

## 2024-03-12 NOTE — PROGRESS NOTES
Name: Choco [place] ADMIT: 3/9/2024   : 2000  PCP: Bertha Riggs MD    MRN: [id number] LOS: 2 days   AGE/SEX: 23 y.o. male  ROOM: [facility]     Subjective   Subjective   Denies any complaint. No chest pain or shortness of breath.     Objective   Objective   Vital Signs  Temp:  [97 °F (36.1 °C)-98.7 °F (37.1 °C)] 97 °F (36.1 °C)  Heart Rate:  [62-97] 63  Resp:  [16-18] 16  BP: (104-134)/(52-70) 104/52  SpO2:  [97 %-99 %] 99 %  on   ;   Device (Oxygen Therapy): room air  Body mass index is 22.32 kg/m².    Physical Exam  Constitutional:       General: He is not in acute distress.     Appearance: Normal appearance. He is not toxic-appearing.   HENT:      Head: Normocephalic and atraumatic.   Cardiovascular:      Rate and Rhythm: Normal rate and regular rhythm.   Pulmonary:      Effort: Pulmonary effort is normal. No respiratory distress.   Abdominal:      General: Bowel sounds are normal.      Palpations: Abdomen is soft.      Tenderness: There is no abdominal tenderness.   Musculoskeletal:         General: No swelling.   Skin:     General: Skin is warm and dry.   Neurological:      General: No focal deficit present.      Mental Status: He is alert and oriented to person, place, and time.   Psychiatric:         Mood and Affect: Mood normal.         Behavior: Behavior normal.     Results Review  I reviewed the patient's new clinical results.  Results from last 7 days   Lab Units 24  0550 24  0717 03/10/24  0624 24  1702   WBC 10*3/mm3 11.54* 10.16 7.66 5.44   HEMOGLOBIN g/dL 14.5 15.6 15.3 14.5   PLATELETS 10*3/mm3 62* 45* 23*  23* 14*     Results from last 7 days   Lab Units 24  0550 24  0717 03/10/24  0624  1743   SODIUM mmol/L 139 139 138 139   POTASSIUM mmol/L 3.6 3.7 4.2 4.0   CHLORIDE mmol/L 104 103 105 105   CO2 mmol/L 26.0 25.8 22.0 24.2   BUN mg/dL 19 19 12 15   CREATININE mg/dL 0.73* 0.84 0.78 0.96   GLUCOSE mg/dL 96 93 134* 105*     Lab Results   Component  Value Date    ANIONGAP 9.0 03/12/2024     Estimated Creatinine Clearance: 161.6 mL/min (A) (by C-G formula based on SCr of 0.73 mg/dL (L)).   Lab Results   Component Value Date    EGFR 131.1 03/12/2024     Results from last 7 days   Lab Units 03/11/24  0717 03/09/24  1743   ALBUMIN g/dL 4.5 4.4   BILIRUBIN mg/dL 0.5 0.3   ALK PHOS U/L 55 54   AST (SGOT) U/L 21 27   ALT (SGPT) U/L 30 28     Results from last 7 days   Lab Units 03/12/24  0550 03/11/24  0717 03/10/24  0623 03/09/24  1743   CALCIUM mg/dL 9.3 9.6 9.8 8.8   ALBUMIN g/dL  --  4.5  --  4.4       Glucose   Date/Time Value Ref Range Status   03/11/2024 1720 143 (H) 70 - 130 mg/dL Final   03/11/2024 1117 93 70 - 130 mg/dL Final   03/11/2024 0615 91 70 - 130 mg/dL Final   03/10/2024 2125 147 (H) 70 - 130 mg/dL Final   03/10/2024 1604 123 70 - 130 mg/dL Final   03/10/2024 1134 134 (H) 70 - 130 mg/dL Final   03/10/2024 0558 124 70 - 130 mg/dL Final       No radiology results for the last day    Scheduled Meds  predniSONE, 70 mg, Oral, Daily  vitamin B-12, 1,000 mcg, Oral, Daily    Continuous Infusions   PRN Meds    acetaminophen **OR** acetaminophen **OR** acetaminophen    senna-docusate sodium **AND** polyethylene glycol **AND** bisacodyl **AND** bisacodyl    ondansetron    sodium chloride     Diet  Diet: Regular/House; Fluid Consistency: Thin (IDDSI 0)    I have personally reviewed:  [x]  Medications  [x]  Laboratory   []  Microbiology   []  Radiology   [x]  EKG/Telemetry sinus on telemetry  []  Cardiology/Vascular   []  Pathology   []  Records      Assessment/Plan     Active Hospital Problems    Diagnosis  POA    **Acute ITP [D69.3]  Yes    HIV test positive [Z21]  Unknown    Positive RPR test [A53.0]  Unknown    Hyperglycemia [R73.9]  Unknown      Resolved Hospital Problems   No resolved problems to display.     23 y.o. male with easy bruising and thrombocytopenia    Thrombocytopenia  Hematology evaluation/workup  ITP vs HIV  Platelets up to 62k  today  Steroids per hematology    HIV  RPR+  appreciate ID eval and recs    Low normal B12  replacement    Hyperglycemia  Probably from steroids- monitoring on lab has been fine so insulin stopped    Discharge  TBD  Expected Discharge Date: 3/13/2024; Expected Discharge Time:     Discussed with patient and nursing staff    Toby Parker MD  [facility]  03/12/24

## 2024-03-13 ENCOUNTER — TELEPHONE (OUTPATIENT)
Dept: ONCOLOGY | Facility: CLINIC | Age: 24
End: 2024-03-13
Payer: COMMERCIAL

## 2024-03-13 LAB
C TRACH RRNA SPEC QL NAA+PROBE: NEGATIVE
N GONORRHOEA RRNA SPEC QL NAA+PROBE: NEGATIVE
T VAGINALIS RRNA SPEC QL NAA+PROBE: NEGATIVE

## 2024-03-13 NOTE — CASE MANAGEMENT/SOCIAL WORK
Case Management Discharge Note      Final Note: Discharged home. Orders reviewed no further discharge needs. ACU orders placed.    Provided Post Acute Provider List?: N/A  N/A Provider List Comment: Denies need  Provided Post Acute Provider Quality & Resource List?: N/A  N/A Quality & Resource List Comment: Denies need    Selected Continued Care - Discharged on 3/12/2024 Admission date: 3/9/2024 - Discharge disposition: Home or Self Care      Destination    No services have been selected for the patient.                Durable Medical Equipment    No services have been selected for the patient.                Dialysis/Infusion    No services have been selected for the patient.                Home Medical Care    No services have been selected for the patient.                Therapy    No services have been selected for the patient.                Community Resources    No services have been selected for the patient.                Community & DME    No services have been selected for the patient.                         Final Discharge Disposition Code: 01 - home or self-care

## 2024-03-13 NOTE — TELEPHONE ENCOUNTER
Caller: Choco Rico    Relationship: Self    Best call back number: [phone]    What is the best time to reach you: ANYTIME    Who are you requesting to speak with (clinical staff, provider,  specific staff member): CLINICAL    What was the call regarding: PATIENT THOUGHT HE WAS SUPPOSED TO BE WEANING OFF THE PREDNISONE BUT NEW SCRIPT STATES TAKE 7 PILLS DAILY OF 10 MG TABLETS. PLEASE CALL ASAP FOR CLARIFICATION.

## 2024-03-13 NOTE — TELEPHONE ENCOUNTER
Call back to Mr. Rico and went over the hospital notes, and dosage of Prednisone.  Let him know the hospital notes state he is to remain on the Prednisone 70 mg daily dose until he sees Dr. Gr in the office, and she will start tapering then.  Patient verbalized understanding and appreciation for the call.

## 2024-03-18 ENCOUNTER — OFFICE VISIT (OUTPATIENT)
Dept: ONCOLOGY | Facility: CLINIC | Age: 24
End: 2024-03-18
Payer: COMMERCIAL

## 2024-03-18 ENCOUNTER — LAB (OUTPATIENT)
Dept: LAB | Facility: HOSPITAL | Age: 24
End: 2024-03-18
Payer: COMMERCIAL

## 2024-03-18 VITALS
TEMPERATURE: 98.9 F | HEIGHT: 71 IN | SYSTOLIC BLOOD PRESSURE: 127 MMHG | DIASTOLIC BLOOD PRESSURE: 80 MMHG | HEART RATE: 66 BPM | WEIGHT: 179.3 LBS | BODY MASS INDEX: 25.1 KG/M2 | RESPIRATION RATE: 16 BRPM | OXYGEN SATURATION: 97 %

## 2024-03-18 DIAGNOSIS — D69.3 ACUTE ITP: ICD-10-CM

## 2024-03-18 DIAGNOSIS — A53.9 SYPHILIS: ICD-10-CM

## 2024-03-18 DIAGNOSIS — D69.3 ACUTE ITP: Primary | ICD-10-CM

## 2024-03-18 LAB
ALBUMIN SERPL-MCNC: 4.1 G/DL (ref 3.5–5.2)
ALBUMIN/GLOB SERPL: 1.6 G/DL
ALP SERPL-CCNC: 53 U/L (ref 39–117)
ALT SERPL W P-5'-P-CCNC: 29 U/L (ref 1–41)
ANION GAP SERPL CALCULATED.3IONS-SCNC: 8.3 MMOL/L (ref 5–15)
AST SERPL-CCNC: 17 U/L (ref 1–40)
BASOPHILS # BLD AUTO: 0.02 10*3/MM3 (ref 0–0.2)
BASOPHILS NFR BLD AUTO: 0.2 % (ref 0–1.5)
BILIRUB SERPL-MCNC: 0.3 MG/DL (ref 0–1.2)
BUN SERPL-MCNC: 17 MG/DL (ref 6–20)
BUN/CREAT SERPL: 21.3 (ref 7–25)
CALCIUM SPEC-SCNC: 9.4 MG/DL (ref 8.6–10.5)
CHLORIDE SERPL-SCNC: 105 MMOL/L (ref 98–107)
CO2 SERPL-SCNC: 27.7 MMOL/L (ref 22–29)
CREAT SERPL-MCNC: 0.8 MG/DL (ref 0.76–1.27)
DEPRECATED RDW RBC AUTO: 40.5 FL (ref 37–54)
EGFRCR SERPLBLD CKD-EPI 2021: 127.5 ML/MIN/1.73
EOSINOPHIL # BLD AUTO: 0 10*3/MM3 (ref 0–0.4)
EOSINOPHIL NFR BLD AUTO: 0 % (ref 0.3–6.2)
ERYTHROCYTE [DISTWIDTH] IN BLOOD BY AUTOMATED COUNT: 12.4 % (ref 12.3–15.4)
GLOBULIN UR ELPH-MCNC: 2.5 GM/DL
GLUCOSE SERPL-MCNC: 122 MG/DL (ref 65–99)
HCT VFR BLD AUTO: 42.1 % (ref 37.5–51)
HGB BLD-MCNC: 14.7 G/DL (ref 13–17.7)
HIV 1 RNA RT + PR + IN MUT DET PLAS SEQ: NORMAL
HLA-B*57:01 SPEC QL: NEGATIVE
IMM GRANULOCYTES # BLD AUTO: 0.11 10*3/MM3 (ref 0–0.05)
IMM GRANULOCYTES NFR BLD AUTO: 0.9 % (ref 0–0.5)
LYMPHOCYTES # BLD AUTO: 2.56 10*3/MM3 (ref 0.7–3.1)
LYMPHOCYTES NFR BLD AUTO: 20.9 % (ref 19.6–45.3)
MCH RBC QN AUTO: 31 PG (ref 26.6–33)
MCHC RBC AUTO-ENTMCNC: 34.9 G/DL (ref 31.5–35.7)
MCV RBC AUTO: 88.8 FL (ref 79–97)
MONOCYTES # BLD AUTO: 1.21 10*3/MM3 (ref 0.1–0.9)
MONOCYTES NFR BLD AUTO: 9.9 % (ref 5–12)
NEUTROPHILS NFR BLD AUTO: 68.1 % (ref 42.7–76)
NEUTROPHILS NFR BLD AUTO: 8.37 10*3/MM3 (ref 1.7–7)
NRBC BLD AUTO-RTO: 0 /100 WBC (ref 0–0.2)
PLATELET # BLD AUTO: 160 10*3/MM3 (ref 140–450)
PMV BLD AUTO: 11 FL (ref 6–12)
POTASSIUM SERPL-SCNC: 4.1 MMOL/L (ref 3.5–5.2)
PROT SERPL-MCNC: 6.6 G/DL (ref 6–8.5)
RBC # BLD AUTO: 4.74 10*6/MM3 (ref 4.14–5.8)
SODIUM SERPL-SCNC: 141 MMOL/L (ref 136–145)
SPECIMEN CONDITION: NORMAL
TEST PERFORMANCE INFO SPEC: NORMAL
WBC NRBC COR # BLD AUTO: 12.27 10*3/MM3 (ref 3.4–10.8)

## 2024-03-18 PROCEDURE — 85025 COMPLETE CBC W/AUTO DIFF WBC: CPT

## 2024-03-18 PROCEDURE — 36415 COLL VENOUS BLD VENIPUNCTURE: CPT

## 2024-03-18 PROCEDURE — 80053 COMPREHEN METABOLIC PANEL: CPT

## 2024-03-18 NOTE — PROGRESS NOTES
REASON FOR FOLLOWUP/CHIEF COMPLAINT:   ITP on prednisone  2. HIV positive, awaiting HIV RNA testing before start of Biktarvy by Dr. Royal  3. 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  He is receiving again on March 19 and March 26 to complete a 3-day 3 dose course.  With plans to recheck RPR in 6 months to confirm for fourfold decline in titer     HISTORY OF PRESENT ILLNESS:     Patient is a 23-year-old gentleman who has new diagnosis of late latent syphilis currently on Bicillin by Dr. Royal and is on to complete 3 doses weekly doses of Bicillin last dose being March 26, 2024.  He also has new diagnosis of HIV antibody positive that Dr. Royal is awaiting HIV RNA prior to starting Biktarvy.    In the meantime patient had been diagnosed with ITP with elevated IPF.  Unsure if this was secondary to his underlying HIV or ITP.  He was discharged with a dose of prednisone 70 mg daily.  His platelet count today is 160.  It has improved from 62 and discharge-160 within a week.  We will try to taper the steroids a little faster.  He is otherwise asymptomatic.      Hematologic history:    Patient is a 23-year-old gentleman who has had easy bruising for the last 2 weeks.  He was admitted March 9, 2024.  Patient had blood work drawn and because of low platelets he was sent to the ER.  There was no active bleeding.  His platelets have been 14.  On admission.  His hemoglobin is 14.5 and a white count of 5.44.  Patient has been started on prednisone 1 mg/kg by Dr. Stevens because of concern for ITP.  Patient has been asymptomatic.  Today's platelets are up to 23.  Hemoglobin 15.3 white count of 7.66.  Neutrophils 83%, lymphocytes 15.1%, 1.4% monocytes 0% eosinophils.  Past Medical History, Past Surgical History, Social History, Family History have been reviewed and are without significant changes except as mentioned.    Hospital Course  23 y.o. male presented with easy bruising and  "found to be thrombocytopenic. Platelets on admission were 23,000. Presumptive diagnosis was made ITP and he was started on steroids and platelets today are 62,000. He was newly diagnosed with HIV antibody was positive. HIV RNA is pending to confirm and ID is going to follow-up with him in a week and likely start him on Biktarvy. He also was newly diagnosed with late latent syphilis and was given bicillin LA 2.4 million units IM today. ID is planning two more doses in the ACU to complete 3 dose course with a repeat RPR in 6 months. Hepatitis A antibody was also positive. Hematology recommends 70 mg prednisone daily and they will follow-up in a week to taper. He had low normal B12 and started on replacement. Today he is without new complaints. No new bruising or bleeding    Patient seen by infectious disease for HIV positive and latent syphilis by Dr. Adams in the hospital.  Patient had HIV antibody positive and Dr. Royal was awaiting HIV RNA to confirm the diagnosis before starting Biktarvy.  Patient has follow-up appointment March 19, 2024 with Dr. Royal.    RPR 1:32 and likely has new diagnosis of late-latent syphilis. I am going to give him bicillin LA 2.4 million units IM today. I have placed orders for him to get doses #2 and #3 in the ACU on 3/19/24 and 3/26/24, respectively, to complete the 3-dose course. Check RPR again in 6 months to confirm 4-fold decline in titer.         Review of Systems   Review of Systems  Review of systems as mentioned in the HPI otherwise negative    Medications:  The current medication list was reviewed in the EMR    ALLERGIES:  No Known Allergies           Vitals:    03/18/24 0808   BP: 127/80   Pulse: 66   Resp: 16   Temp: 98.9 °F (37.2 °C)   TempSrc: Temporal   SpO2: 97%   Weight: 81.3 kg (179 lb 4.8 oz)   Height: 180.3 cm (70.98\")   PainSc: 0-No pain     Physical Exam    CONSTITUTIONAL:  Vital signs reviewed.  No distress, looks comfortable.  EYES:  Conjunctivae " and lids unremarkable.  PERRLA  EARS,NOSE,MOUTH,THROAT:  Ears and nose appear unremarkable.  Lips, teeth, gums appear unremarkable.  RESPIRATORY:  Normal respiratory effort.  Lungs clear to auscultation bilaterally.  CARDIOVASCULAR:  Normal S1, S2.  No murmurs rubs or gallops.  No significant lower extremity edema.  GASTROINTESTINAL: Abdomen appears unremarkable.  Nontender.  No hepatomegaly.  No splenomegaly.  NEURO: cranial nerves 2-12 grossly intact.  No focal deficits.  Appears to have equal strength all 4 extremities.  MUSCULOSKELETAL:  Unremarkable digits/nails.  No cyanosis or clubbing.  SKIN:  Warm.  No rashes.  Multiple tattoos.  PSYCHIATRIC:  Normal judgment and insight.  Normal mood and affect.    I have reexamined the patient and the results are consistent with the previously documented exam. Susannah Gr MD         RECENT LABS:  WBC   Date Value Ref Range Status   03/18/2024 12.27 (H) 3.40 - 10.80 10*3/mm3 Final     Hemoglobin   Date Value Ref Range Status   03/18/2024 14.7 13.0 - 17.7 g/dL Final     Platelets   Date Value Ref Range Status   03/18/2024 160 140 - 450 10*3/mm3 Final       ASSESSMENT/PLAN:  [facility] Room/bed info not found     *ITP  Platelet count 14,000 on presentation with normal WBC count as well as hemoglobin.  Started on prednisone 1 mg/kg, 70 mg daily.  Platelets further improved at 62,000  Tested positive for HIV.  HIV panel pending.  Likely etiology for ITP  Continue prednisone  TEZ pending  B12 borderline at 360  Continue oral vitamin B12  Patient can be discharged home and continue on oral prednisone 70 mg daily.  He will be scheduled to see Dr. Gr in the clinic next week and we will initiate steroid taper once the platelet counts have normalized.  March 18, 2024: We will decrease prednisone to 60 mg daily for 3 days followed by 50 mg daily till he sees me in 1 week.  Will check IPF at his next appointment.  Unsure if this thrombocytopenia is HIV related versus ITP  and could consider faster taper.    *HIV positive  HIV panel pending  Evaluated by infectious disease and started on treatment for syphilis.  Patient now on penicillin.  Plans for follow-up as an outpatient for HIV care.  Hepatitis B and hepatitis C testing negative  Dr. Royal awaiting to confirm HIV by HIV RNA test.  Patient has appointment March 19, 2024 with Dr. Royal at which time he is going to make a decision about starting Biktarvy    *.  Late latent syphilis: Patient is on Bicillin 2,400,000 units IM once weekly for total of 3 doses last dose will be March 26, 2024.      Plan  Will decrease dose of prednisone to 60 mg daily for 3 days and then go to 50 mg daily  Continue Bicillin per infectious disease, last dose March 26, 2024  Await HIV RNA  Patient has follow-up with Dr. Royal tomorrow to make a decision on starting Biktarvy  Continue to follow-up with me in 1 week next Thursday at 8:40 AM with labs  Reviewed all records from the recent hospitalization.      I spent 50 total minutes, face-to-face, caring for Choco today. Greater than 50% of this time involved counseling and/or coordination of care as documented within this note.    Susannah Gr MD        Cc: Dr. Royal

## 2024-03-19 ENCOUNTER — TELEPHONE (OUTPATIENT)
Dept: INFECTIOUS DISEASES | Facility: CLINIC | Age: 24
End: 2024-03-19
Payer: COMMERCIAL

## 2024-03-19 ENCOUNTER — OFFICE VISIT (OUTPATIENT)
Dept: INFECTIOUS DISEASES | Facility: CLINIC | Age: 24
End: 2024-03-19
Payer: COMMERCIAL

## 2024-03-19 VITALS
HEART RATE: 80 BPM | TEMPERATURE: 97.1 F | WEIGHT: 180.4 LBS | SYSTOLIC BLOOD PRESSURE: 131 MMHG | DIASTOLIC BLOOD PRESSURE: 78 MMHG | BODY MASS INDEX: 25.17 KG/M2 | RESPIRATION RATE: 20 BRPM

## 2024-03-19 DIAGNOSIS — A52.8 LATE LATENT SYPHILIS: ICD-10-CM

## 2024-03-19 DIAGNOSIS — Z21 ASYMPTOMATIC HIV INFECTION, WITH NO HISTORY OF HIV-RELATED ILLNESS: ICD-10-CM

## 2024-03-19 DIAGNOSIS — D69.3 ACUTE ITP: ICD-10-CM

## 2024-03-19 PROCEDURE — 99214 OFFICE O/P EST MOD 30 MIN: CPT | Performed by: INTERNAL MEDICINE

## 2024-03-19 RX ORDER — BICTEGRAVIR SODIUM, EMTRICITABINE, AND TENOFOVIR ALAFENAMIDE FUMARATE 50; 200; 25 MG/1; MG/1; MG/1
1 TABLET ORAL DAILY
Qty: 30 TABLET | Refills: 5 | Status: SHIPPED | OUTPATIENT
Start: 2024-03-19 | End: 2024-04-18

## 2024-03-19 NOTE — TELEPHONE ENCOUNTER
Per Dr. Royal's request I called [facility] and spoke w/ Melissa to inform her that patient is requesting to change his visit today at [facility] to tomorrow due to his work schedule. I told her that per Dr. Royal patient will need his final injection of Penicillin G Benzathine done at [facility] on 3/28/24 and that the best time for patient is between 9:30a- 12pm. Melissa said she would speak w/ her carge nurse to have those scheduled and then contact patient. I then called patient to inform him that staff from [facility] will be calling him today to set up the times for the Abx injections for tomorrow & on 3/28/24 and that they are aware that the best time for him is between 9:30am- 12p. Patient stated understanding.

## 2024-03-19 NOTE — PROGRESS NOTES
ID CLINIC NOTE    CC: f/u HIV    HPI: Choco [facility] is a 23 y.o. male here for f/u HIV.  I saw him in the hospital for this problem from 3/11-3/12/2024.  He presented with thrombocytopenia and was diagnosed with ITP.  Hematology ordered an HIV antibody test which returned as positive.  His HIV viral load returned with 83,300 and copies confirming the diagnosis.  His CD4 count was 271/21%.  Also during that hospital stay, he was diagnosed with late latent syphilis and given an intramuscular injection of Bicillin.  He went to the ACU this morning for his second injection.  He will return to the ACU next week for the third and final injection.    Appointment was made with me today to discuss and begin antiretroviral therapy.    For ITP, he remains on a prednisone taper.  He has follow-up with hematology in clinic on 3/28/2024.     Past Medical History:   Diagnosis Date    HIV positive    Late-latent syphilis - treated Mar 2024    Past Surgical History:   Procedure Laterality Date    WISDOM TOOTH EXTRACTION       Social History:  Lives in [place], [place] on the weekends but is in [place] during the week for work  Works in a [employer]    Antibiotic allergies and intolerances:  NONE    Medications:     Current Outpatient Medications:     famotidine (Pepcid) 40 MG tablet, Take 1 tablet by mouth 2 (Two) Times a Day., Disp: 60 tablet, Rfl: 0    predniSONE (DELTASONE) 10 MG tablet, Take 7 tablets by mouth Daily for 10 days., Disp: 70 tablet, Rfl: 0    vitamin B-12 (VITAMIN B-12) 1000 MCG tablet, Take 1 tablet by mouth Daily., Disp: 30 tablet, Rfl: 0      OBJECTIVE:  /78   Pulse 80   Temp 97.1 °F (36.2 °C)   Resp 20   Wt 81.8 kg (180 lb 6.4 oz)   BMI 25.17 kg/m²     General: awake, alert, NAD  Eyes: no scleral icterus  ENT: no thrush  Cardiovascular: NR  Respiratory: normal work of breathing on RA  GI: Abdomen is soft, not tender or distended  Neurological: Alert and oriented x 3  Psychiatric: Normal mood and affect      DIAGNOSTICS:  TEZ, CBC, BMP, and all HIV-related labs reviewed today  Lab Results   Component Value Date    WBC 12.27 (H) 03/18/2024    HGB 14.7 03/18/2024    HCT 42.1 03/18/2024     03/18/2024     Lab Results   Component Value Date    GLUCOSE 122 (H) 03/18/2024    CALCIUM 9.4 03/18/2024     03/18/2024    K 4.1 03/18/2024    CO2 27.7 03/18/2024     03/18/2024    BUN 17 03/18/2024    CREATININE 0.80 03/18/2024    EGFR 127.5 03/18/2024    BCR 21.3 03/18/2024    ANIONGAP 8.3 03/18/2024       HIV-related labs:  HIV antibody positive (3/2024)  HIV RNA: 83,300 copies (3/2024)  HIV Genotype: K103 mutation (EFV resistance) (3/2024)  CD4 count 274/21% (3/2024)  HLA-: negative (3/2024)  Hep A total antibody positive  Hep B surface antigen non-reactive  Hep B core IgM Ab non-reactive  Hep B surface antibody non-reactive  Hep C antibody negative  RPR 1:32; TPA-Abs positive (3/2024)  Urine GC/CT NAAT negative (3/2024)  Toxo Ab negative (3/2024)  Crypto Ag negative  ----------------------------------------------  TEZ negative      ASSESSMENT/PLAN:  New diagnosis HIV  Thrombocytopenia due to #1 versus ITP  Late latent syphilis    For new diagnosis HIV, start Biktarvy once daily.  Rx sent to [facility] pharmacy.  Discussed importance of daily adherence.  Medication can be taken with or without food.    For late latent syphilis, he will go to the ACU either today or tomorrow for his second dose of Bicillin 2,400,000 units IM.  We will also schedule him again in 1 week for his third and final dose.  I will repeat an RPR in about 6 months to make sure it has come down fourfold which would be consistent with cure.    He has follow-up with hematology next week for his ITP.    RTC 6 weeks for clinical follow-up and labs.

## 2024-03-20 ENCOUNTER — HOSPITAL ENCOUNTER (OUTPATIENT)
Dept: INFUSION THERAPY | Facility: HOSPITAL | Age: 24
Discharge: HOME | End: 2024-03-20
Payer: COMMERCIAL

## 2024-03-20 VITALS
OXYGEN SATURATION: 99 % | DIASTOLIC BLOOD PRESSURE: 66 MMHG | SYSTOLIC BLOOD PRESSURE: 123 MMHG | TEMPERATURE: 97.7 F | RESPIRATION RATE: 18 BRPM | HEART RATE: 75 BPM

## 2024-03-20 DIAGNOSIS — A53.0 POSITIVE RPR TEST: Primary | ICD-10-CM

## 2024-03-20 PROCEDURE — 25010000002 PENICILLIN G BENZATHINE PER 2400000 UNITS: Performed by: INTERNAL MEDICINE

## 2024-03-20 PROCEDURE — 96372 THER/PROPH/DIAG INJ SC/IM: CPT

## 2024-03-20 RX ADMIN — PENICILLIN G BENZATHINE 2.4 MILLION UNITS: 2400000 INJECTION, SUSPENSION INTRAMUSCULAR at 10:51

## 2024-03-22 ENCOUNTER — TELEPHONE (OUTPATIENT)
Dept: INFECTIOUS DISEASES | Facility: CLINIC | Age: 24
End: 2024-03-22
Payer: COMMERCIAL

## 2024-03-22 NOTE — TELEPHONE ENCOUNTER
FYI: I called and spoke to Natasha at [facility]. She states they were able to fill the patient's prescription with no insurance issues. Patient should receive the medication today. Patient was notified and advised to call us if any questions or concerns once he received the med. NABIL RN

## 2024-03-25 ENCOUNTER — TELEPHONE (OUTPATIENT)
Dept: ONCOLOGY | Facility: CLINIC | Age: 24
End: 2024-03-25
Payer: COMMERCIAL

## 2024-03-25 RX ORDER — PREDNISONE 10 MG/1
50 TABLET ORAL DAILY
Qty: 40 TABLET | Refills: 1 | Status: SHIPPED | OUTPATIENT
Start: 2024-03-25 | End: 2024-03-28 | Stop reason: SDUPTHER

## 2024-03-25 NOTE — TELEPHONE ENCOUNTER
I'm running low on my prednisone and you told me to let you know when I'm running low. I sent a message Friday night when I got outta work, but haven't heard anything. I didn't know your office was closed on the weekends letting you know now. I'll need a refill of that tomorrow. I think I'm one pill short of what I should be taking today, so I will definitely need that tomorrow.  03/24/2024 08:48 AM

## 2024-03-25 NOTE — TELEPHONE ENCOUNTER
Attempted to call no answer left message.  Prescription refill sent to [facility].

## 2024-03-27 ENCOUNTER — HOSPITAL ENCOUNTER (OUTPATIENT)
Dept: INFUSION THERAPY | Facility: HOSPITAL | Age: 24
Discharge: HOME | End: 2024-03-27
Payer: COMMERCIAL

## 2024-03-27 VITALS
DIASTOLIC BLOOD PRESSURE: 68 MMHG | RESPIRATION RATE: 20 BRPM | OXYGEN SATURATION: 97 % | SYSTOLIC BLOOD PRESSURE: 119 MMHG | HEART RATE: 68 BPM | TEMPERATURE: 96.9 F

## 2024-03-27 DIAGNOSIS — A53.0 POSITIVE RPR TEST: Primary | ICD-10-CM

## 2024-03-27 PROCEDURE — 96372 THER/PROPH/DIAG INJ SC/IM: CPT

## 2024-03-27 PROCEDURE — 25010000002 PENICILLIN G BENZATHINE PER 2400000 UNITS: Performed by: INTERNAL MEDICINE

## 2024-03-27 RX ADMIN — PENICILLIN G BENZATHINE 2.4 MILLION UNITS: 2400000 INJECTION, SUSPENSION INTRAMUSCULAR at 12:24

## 2024-03-28 ENCOUNTER — LAB (OUTPATIENT)
Dept: LAB | Facility: HOSPITAL | Age: 24
End: 2024-03-28
Payer: COMMERCIAL

## 2024-03-28 ENCOUNTER — OFFICE VISIT (OUTPATIENT)
Dept: ONCOLOGY | Facility: CLINIC | Age: 24
End: 2024-03-28
Payer: COMMERCIAL

## 2024-03-28 VITALS
RESPIRATION RATE: 16 BRPM | DIASTOLIC BLOOD PRESSURE: 71 MMHG | OXYGEN SATURATION: 97 % | WEIGHT: 179.2 LBS | HEART RATE: 58 BPM | TEMPERATURE: 98.6 F | HEIGHT: 71 IN | SYSTOLIC BLOOD PRESSURE: 128 MMHG | BODY MASS INDEX: 25.09 KG/M2

## 2024-03-28 DIAGNOSIS — D69.3 ACUTE ITP: Primary | ICD-10-CM

## 2024-03-28 DIAGNOSIS — D69.3 ACUTE ITP: ICD-10-CM

## 2024-03-28 DIAGNOSIS — A53.0 LATENT SYPHILIS: ICD-10-CM

## 2024-03-28 DIAGNOSIS — A53.9 SYPHILIS: ICD-10-CM

## 2024-03-28 DIAGNOSIS — Z21 HIV TEST POSITIVE: ICD-10-CM

## 2024-03-28 LAB
ALBUMIN SERPL-MCNC: 4.2 G/DL (ref 3.5–5.2)
ALBUMIN/GLOB SERPL: 1.7 G/DL
ALP SERPL-CCNC: 49 U/L (ref 39–117)
ALT SERPL W P-5'-P-CCNC: 18 U/L (ref 1–41)
ANION GAP SERPL CALCULATED.3IONS-SCNC: 10.2 MMOL/L (ref 5–15)
AST SERPL-CCNC: 15 U/L (ref 1–40)
BASOPHILS # BLD AUTO: 0.02 10*3/MM3 (ref 0–0.2)
BASOPHILS NFR BLD AUTO: 0.2 % (ref 0–1.5)
BILIRUB SERPL-MCNC: 0.3 MG/DL (ref 0–1.2)
BUN SERPL-MCNC: 15 MG/DL (ref 6–20)
BUN/CREAT SERPL: 16.7 (ref 7–25)
CALCIUM SPEC-SCNC: 9.5 MG/DL (ref 8.6–10.5)
CHLORIDE SERPL-SCNC: 105 MMOL/L (ref 98–107)
CO2 SERPL-SCNC: 25.8 MMOL/L (ref 22–29)
CREAT SERPL-MCNC: 0.9 MG/DL (ref 0.76–1.27)
DEPRECATED RDW RBC AUTO: 40.5 FL (ref 37–54)
EGFRCR SERPLBLD CKD-EPI 2021: 123.1 ML/MIN/1.73
EOSINOPHIL # BLD AUTO: 0.02 10*3/MM3 (ref 0–0.4)
EOSINOPHIL NFR BLD AUTO: 0.2 % (ref 0.3–6.2)
ERYTHROCYTE [DISTWIDTH] IN BLOOD BY AUTOMATED COUNT: 12.4 % (ref 12.3–15.4)
GLOBULIN UR ELPH-MCNC: 2.5 GM/DL
GLUCOSE SERPL-MCNC: 104 MG/DL (ref 65–99)
HCT VFR BLD AUTO: 41.5 % (ref 37.5–51)
HGB BLD-MCNC: 14.1 G/DL (ref 13–17.7)
IMM GRANULOCYTES # BLD AUTO: 0.04 10*3/MM3 (ref 0–0.05)
IMM GRANULOCYTES NFR BLD AUTO: 0.3 % (ref 0–0.5)
LYMPHOCYTES # BLD AUTO: 2.4 10*3/MM3 (ref 0.7–3.1)
LYMPHOCYTES NFR BLD AUTO: 20.3 % (ref 19.6–45.3)
MCH RBC QN AUTO: 30.9 PG (ref 26.6–33)
MCHC RBC AUTO-ENTMCNC: 34 G/DL (ref 31.5–35.7)
MCV RBC AUTO: 90.8 FL (ref 79–97)
MONOCYTES # BLD AUTO: 1.31 10*3/MM3 (ref 0.1–0.9)
MONOCYTES NFR BLD AUTO: 11.1 % (ref 5–12)
NEUTROPHILS NFR BLD AUTO: 67.9 % (ref 42.7–76)
NEUTROPHILS NFR BLD AUTO: 8.05 10*3/MM3 (ref 1.7–7)
NRBC BLD AUTO-RTO: 0 /100 WBC (ref 0–0.2)
PLATELET # BLD AUTO: 145 10*3/MM3 (ref 140–450)
PLATELETS.RETICULATED NFR BLD AUTO: 7.2 % (ref 0.9–6.5)
PMV BLD AUTO: 10.8 FL (ref 6–12)
POTASSIUM SERPL-SCNC: 3.4 MMOL/L (ref 3.5–5.2)
PROT SERPL-MCNC: 6.7 G/DL (ref 6–8.5)
RBC # BLD AUTO: 4.57 10*6/MM3 (ref 4.14–5.8)
SODIUM SERPL-SCNC: 141 MMOL/L (ref 136–145)
WBC NRBC COR # BLD AUTO: 11.84 10*3/MM3 (ref 3.4–10.8)

## 2024-03-28 PROCEDURE — 85025 COMPLETE CBC W/AUTO DIFF WBC: CPT

## 2024-03-28 PROCEDURE — 85055 RETICULATED PLATELET ASSAY: CPT

## 2024-03-28 PROCEDURE — 80053 COMPREHEN METABOLIC PANEL: CPT

## 2024-03-28 PROCEDURE — 36415 COLL VENOUS BLD VENIPUNCTURE: CPT

## 2024-03-28 RX ORDER — PREDNISONE 10 MG/1
TABLET ORAL
Qty: 60 TABLET | Refills: 2 | Status: SHIPPED | OUTPATIENT
Start: 2024-03-28

## 2024-03-28 NOTE — PROGRESS NOTES
REASON FOR FOLLOWUP/CHIEF COMPLAINT:   ITP on prednisone  2. HIV positive, awaiting HIV RNA testing before start of Biktarvy by Dr. Royal  HIV viral load is 83,300 confirming the diagnosis.  CD4 count was 271-21%.  Patient started on Biktarvy  3. 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  He is receiving again on March 19 and March 26 to complete a 3-day 3 dose course.  With plans to recheck RPR in 6 months to confirm for fourfold decline in titer     HISTORY OF PRESENT ILLNESS:     Patient is a 23-year-old gentleman who has new diagnosis of late latent syphilis currently on Bicillin by Dr. Royal and is on to complete 3 doses weekly doses of Bicillin last dose being March 26, 2024.  He also has new diagnosis of HIV antibody positive that Dr. Royal is awaiting HIV RNA prior to starting Biktarvy.    In the meantime patient had been diagnosed with ITP with elevated IPF.  Unsure if this was secondary to his underlying HIV or ITP.  He was discharged with a dose of prednisone 70 mg daily.  His platelet count today is 160.  It has improved from 62 and discharge-160 within a week.  We will try to taper the steroids a little faster.  He is otherwise asymptomatic.    March 28, 2024: Patient was seen by Dr. Royal infectious disease.  Patient has confirmation of his HIV and is starting Biktarvy by infectious disease Dr. Royal.  He also has syphilis late latent syphilis for which he is completing intramuscular injection of Bicillin.  He has already completed his third and final injection.    Thrombocytopenia could be related to his HIV and we may need to do a quicker taper on the prednisone.    Patient is currently on 50 mg of prednisone daily.  His platelet count today is 145.  We discussed about keeping him on acyclovir 400 twice daily and Bactrim DS 1 p.o. every Monday Wednesday Friday till his prednisone drops down to 20 mg daily but patient does not want to do that as  he says he is already taking too many pills.    However given that the thrombocytopenia could potentially be related to HIV we discussed about faster taper which he does want to try.  We did warn him that there is always a possibility that his platelets may drop further.  I also discussed with Dr. Royal today and Biktarvy is usually does not drop the blood counts.      Hematologic history:    Patient is a 23-year-old gentleman who has had easy bruising for the last 2 weeks.  He was admitted March 9, 2024.  Patient had blood work drawn and because of low platelets he was sent to the ER.  There was no active bleeding.  His platelets have been 14.  On admission.  His hemoglobin is 14.5 and a white count of 5.44.  Patient has been started on prednisone 1 mg/kg by Dr. Stevens because of concern for ITP.  Patient has been asymptomatic.  Today's platelets are up to 23.  Hemoglobin 15.3 white count of 7.66.  Neutrophils 83%, lymphocytes 15.1%, 1.4% monocytes 0% eosinophils.  Past Medical History, Past Surgical History, Social History, Family History have been reviewed and are without significant changes except as mentioned.    Hospital Course  23 y.o. male presented with easy bruising and found to be thrombocytopenic. Platelets on admission were 23,000. Presumptive diagnosis was made ITP and he was started on steroids and platelets today are 62,000. He was newly diagnosed with HIV antibody was positive. HIV RNA is pending to confirm and ID is going to follow-up with him in a week and likely start him on Biktarvy. He also was newly diagnosed with late latent syphilis and was given bicillin LA 2.4 million units IM today. ID is planning two more doses in the ACU to complete 3 dose course with a repeat RPR in 6 months. Hepatitis A antibody was also positive. Hematology recommends 70 mg prednisone daily and they will follow-up in a week to taper. He had low normal B12 and started on replacement. Today he is without new  complaints. No new bruising or bleeding    Patient seen by infectious disease for HIV positive and latent syphilis by Dr. Adams in the hospital.  Patient had HIV antibody positive and Dr. Royal was awaiting HIV RNA to confirm the diagnosis before starting Biktarvy.  Patient has follow-up appointment March 19, 2024 with Dr. Royal.    RPR 1:32 and likely has new diagnosis of late-latent syphilis. I am going to give him bicillin LA 2.4 million units IM today. I have placed orders for him to get doses #2 and #3 in the ACU on 3/19/24 and 3/26/24, respectively, to complete the 3-dose course. Check RPR again in 6 months to confirm 4-fold decline in titer.         Review of Systems   Review of Systems   Constitutional:  Negative for appetite change, chills, diaphoresis, fatigue, fever and unexpected weight change.   HENT:  Negative for hearing loss, sore throat and trouble swallowing.    Respiratory:  Negative for cough, chest tightness, shortness of breath and wheezing.    Cardiovascular:  Negative for chest pain, palpitations and leg swelling.   Gastrointestinal:  Negative for abdominal distention, abdominal pain, constipation, diarrhea, nausea and vomiting.   Genitourinary:  Negative for dysuria, frequency, hematuria and urgency.   Musculoskeletal:  Negative for joint swelling.        No muscle weakness.   Skin:  Negative for rash and wound.   Neurological:  Negative for seizures, syncope, speech difficulty, weakness, numbness and headaches.   Hematological:  Negative for adenopathy. Does not bruise/bleed easily.   Psychiatric/Behavioral:  Negative for behavioral problems, confusion and suicidal ideas.      Review of systems as mentioned in the HPI otherwise negative    Medications:  The current medication list was reviewed in the EMR    ALLERGIES:  No Known Allergies           Vitals:    03/28/24 0933   BP: 128/71   Pulse: 58   Resp: 16   Temp: 98.6 °F (37 °C)   TempSrc: Temporal   SpO2: 97%   Weight:  "81.3 kg (179 lb 3.2 oz)   Height: 180.3 cm (70.98\")   PainSc: 0-No pain       Physical Exam    CONSTITUTIONAL:  Vital signs reviewed.  No distress, looks comfortable.  RESPIRATORY:  Normal respiratory effort.  Lungs clear to auscultation bilaterally.  CARDIOVASCULAR:  Normal S1, S2.  No murmurs rubs or gallops.  No significant lower extremity edema.  GASTROINTESTINAL: Abdomen appears unremarkable.  Nontender.  No hepatomegaly.  No splenomegaly.  NEURO: cranial nerves 2-12 grossly intact.  No focal deficits.  Appears to have equal strength all 4 extremities.  MUSCULOSKELETAL:  Unremarkable digits/nails.  No cyanosis or clubbing.  SKIN:  Warm.  No rashes.  Multiple tattoos.  PSYCHIATRIC:  Normal judgment and insight.  Normal mood and affect.    I have reexamined the patient and the results are consistent with the previously documented exam. Susannah Gr MD         RECENT LABS:  WBC   Date Value Ref Range Status   03/28/2024 11.84 (H) 3.40 - 10.80 10*3/mm3 Final     Hemoglobin   Date Value Ref Range Status   03/28/2024 14.1 13.0 - 17.7 g/dL Final     Platelets   Date Value Ref Range Status   03/28/2024 145 140 - 450 10*3/mm3 Final         ASSESSMENT/PLAN:  [facility] Room/bed info not found     *ITP  Platelet count 14,000 on presentation with normal WBC count as well as hemoglobin.  Started on prednisone 1 mg/kg, 70 mg daily.  Platelets further improved at 62,000  Tested positive for HIV.  HIV panel pending.  Likely etiology for ITP  Continue prednisone  TEZ pending  B12 borderline at 360  Continue oral vitamin B12  Patient can be discharged home and continue on oral prednisone 70 mg daily.  He will be scheduled to see Dr. Gr in the clinic next week and we will initiate steroid taper once the platelet counts have normalized.  March 18, 2024: We will decrease prednisone to 60 mg daily for 3 days followed by 50 mg daily till he sees me in 1 week.  Will check IPF at his next appointment.  Unsure if this " thrombocytopenia is HIV related versus ITP and could consider faster taper.  March 28, 2024: Currently patient is on 50 mg of prednisone.  Discussed about consideration of acyclovir 400 mg p.o. twice daily and Bactrim DS 1 p.o. every Monday Wednesday Friday to prevent PCP but patient is not agreeable to start dose.  He refuses.  However given that the thrombocytopenia could be related to underlying HIV we have suggested faster taper so that he can come to 20 mg daily.  We will follow him very closely.  He has been tolerating prednisone.  He is not hyper he is able to see sleep.    *HIV positive  HIV panel pending  Evaluated by infectious disease and started on treatment for syphilis.  Patient now on penicillin.  Plans for follow-up as an outpatient for HIV care.  Hepatitis B and hepatitis C testing negative  Dr. Royal awaiting to confirm HIV by HIV RNA test.  Patient has appointment March 19, 2024 with Dr. Royal at which time he is going to make a decision about starting Biktarv  His HIV viral load returned with 83,300 and copies confirming the diagnosis. His CD4 count was 271/21%.   Patient started Biktarvy     *.  Late latent syphilis: Patient is on Bicillin 2,400,000 units IM once weekly for total of 3 doses last dose will be March 26, 2024.  Completed 3-day injections intramuscularly of Bicillin      Plan  Patient has confirmation of HIV diagnosis and started on Biktarvy by both Dr. Royal  Patient completed intramuscular injection of Bicillin, 3-day course for his late latent syphilis  Will taper prednisone quickly as thrombocytopenia could be related to his HIV  Discussed in length with Dr. Royal as well  Patient refuses to take acyclovir prophylaxis as well as Bactrim prophylaxis.  We discussed about faster taper of his prednisone and we will do 40 mg daily for 3 days and then 30 mg daily for 3 days and then he will go to 20 mg daily.  I have sent a prescription for him  Follow-up in 1 week  with me with labs      I spent 40 total minutes, face-to-face, caring for Choco today. Greater than 50% of this time involved counseling and/or coordination of care as documented within this note.    Susannah Gr MD        Cc: Dr. Royal

## 2024-04-03 ENCOUNTER — OFFICE VISIT (OUTPATIENT)
Dept: ONCOLOGY | Facility: CLINIC | Age: 24
End: 2024-04-03
Payer: COMMERCIAL

## 2024-04-03 ENCOUNTER — LAB (OUTPATIENT)
Dept: LAB | Facility: HOSPITAL | Age: 24
End: 2024-04-03
Payer: COMMERCIAL

## 2024-04-03 VITALS
HEIGHT: 71 IN | TEMPERATURE: 98 F | DIASTOLIC BLOOD PRESSURE: 61 MMHG | BODY MASS INDEX: 25.1 KG/M2 | SYSTOLIC BLOOD PRESSURE: 111 MMHG | WEIGHT: 179.3 LBS | RESPIRATION RATE: 16 BRPM | OXYGEN SATURATION: 96 % | HEART RATE: 78 BPM

## 2024-04-03 DIAGNOSIS — D69.3 ACUTE ITP: ICD-10-CM

## 2024-04-03 DIAGNOSIS — Z21 HIV TEST POSITIVE: ICD-10-CM

## 2024-04-03 DIAGNOSIS — D69.3 ACUTE ITP: Primary | ICD-10-CM

## 2024-04-03 DIAGNOSIS — A53.0 LATENT SYPHILIS: ICD-10-CM

## 2024-04-03 LAB
ALBUMIN SERPL-MCNC: 4.4 G/DL (ref 3.5–5.2)
ALBUMIN/GLOB SERPL: 2.4 G/DL
ALP SERPL-CCNC: 43 U/L (ref 39–117)
ALT SERPL W P-5'-P-CCNC: 17 U/L (ref 1–41)
ANION GAP SERPL CALCULATED.3IONS-SCNC: 10.7 MMOL/L (ref 5–15)
AST SERPL-CCNC: 15 U/L (ref 1–40)
BASOPHILS # BLD AUTO: 0.02 10*3/MM3 (ref 0–0.2)
BASOPHILS NFR BLD AUTO: 0.3 % (ref 0–1.5)
BILIRUB SERPL-MCNC: 0.4 MG/DL (ref 0–1.2)
BUN SERPL-MCNC: 17 MG/DL (ref 6–20)
BUN/CREAT SERPL: 16.5 (ref 7–25)
CALCIUM SPEC-SCNC: 9.6 MG/DL (ref 8.6–10.5)
CHLORIDE SERPL-SCNC: 105 MMOL/L (ref 98–107)
CO2 SERPL-SCNC: 25.3 MMOL/L (ref 22–29)
CREAT SERPL-MCNC: 1.03 MG/DL (ref 0.76–1.27)
DEPRECATED RDW RBC AUTO: 41.4 FL (ref 37–54)
EGFRCR SERPLBLD CKD-EPI 2021: 104.7 ML/MIN/1.73
EOSINOPHIL # BLD AUTO: 0.06 10*3/MM3 (ref 0–0.4)
EOSINOPHIL NFR BLD AUTO: 0.8 % (ref 0.3–6.2)
ERYTHROCYTE [DISTWIDTH] IN BLOOD BY AUTOMATED COUNT: 12.7 % (ref 12.3–15.4)
GLOBULIN UR ELPH-MCNC: 1.8 GM/DL
GLUCOSE SERPL-MCNC: 86 MG/DL (ref 65–99)
HCT VFR BLD AUTO: 43 % (ref 37.5–51)
HGB BLD-MCNC: 14.8 G/DL (ref 13–17.7)
IMM GRANULOCYTES # BLD AUTO: 0.03 10*3/MM3 (ref 0–0.05)
IMM GRANULOCYTES NFR BLD AUTO: 0.4 % (ref 0–0.5)
LYMPHOCYTES # BLD AUTO: 3.09 10*3/MM3 (ref 0.7–3.1)
LYMPHOCYTES NFR BLD AUTO: 38.8 % (ref 19.6–45.3)
MCH RBC QN AUTO: 31.2 PG (ref 26.6–33)
MCHC RBC AUTO-ENTMCNC: 34.4 G/DL (ref 31.5–35.7)
MCV RBC AUTO: 90.5 FL (ref 79–97)
MONOCYTES # BLD AUTO: 0.91 10*3/MM3 (ref 0.1–0.9)
MONOCYTES NFR BLD AUTO: 11.4 % (ref 5–12)
NEUTROPHILS NFR BLD AUTO: 3.86 10*3/MM3 (ref 1.7–7)
NEUTROPHILS NFR BLD AUTO: 48.3 % (ref 42.7–76)
NRBC BLD AUTO-RTO: 0 /100 WBC (ref 0–0.2)
PLATELET # BLD AUTO: 171 10*3/MM3 (ref 140–450)
PLATELETS.RETICULATED NFR BLD AUTO: 4.9 % (ref 0.9–6.5)
PMV BLD AUTO: 10.6 FL (ref 6–12)
POTASSIUM SERPL-SCNC: 4.3 MMOL/L (ref 3.5–5.2)
PROT SERPL-MCNC: 6.2 G/DL (ref 6–8.5)
RBC # BLD AUTO: 4.75 10*6/MM3 (ref 4.14–5.8)
SODIUM SERPL-SCNC: 141 MMOL/L (ref 136–145)
WBC NRBC COR # BLD AUTO: 7.97 10*3/MM3 (ref 3.4–10.8)

## 2024-04-03 PROCEDURE — 99214 OFFICE O/P EST MOD 30 MIN: CPT | Performed by: INTERNAL MEDICINE

## 2024-04-03 PROCEDURE — 36415 COLL VENOUS BLD VENIPUNCTURE: CPT

## 2024-04-03 PROCEDURE — 85025 COMPLETE CBC W/AUTO DIFF WBC: CPT

## 2024-04-03 PROCEDURE — 85055 RETICULATED PLATELET ASSAY: CPT

## 2024-04-03 PROCEDURE — 80053 COMPREHEN METABOLIC PANEL: CPT

## 2024-04-03 NOTE — PROGRESS NOTES
REASON FOR FOLLOWUP/CHIEF COMPLAINT:   ITP on prednisone  2. HIV positive, awaiting HIV RNA testing before start of Biktarvy by Dr. Royal  HIV viral load is 83,300 confirming the diagnosis.  CD4 count was 271-21%.  Patient started on Biktarvy  3. 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  He is receiving again on March 19 and March 26 to complete a 3-day 3 dose course.  With plans to recheck RPR in 6 months to confirm for fourfold decline in titer     HISTORY OF PRESENT ILLNESS:     Patient is a 23-year-old gentleman who has new diagnosis of late latent syphilis currently on Bicillin by Dr. Royal and is on to complete 3 doses weekly doses of Bicillin last dose being March 26, 2024.  He also has new diagnosis of HIV antibody positive that Dr. Royal is awaiting HIV RNA prior to starting Biktarvy.    In the meantime patient had been diagnosed with ITP with elevated IPF.  Unsure if this was secondary to his underlying HIV or ITP.  He was discharged with a dose of prednisone 70 mg daily.  His platelet count today is 160.  It has improved from 62 and discharge-160 within a week.  We will try to taper the steroids a little faster.  He is otherwise asymptomatic.    March 28, 2024: Patient was seen by Dr. Royal infectious disease.  Patient has confirmation of his HIV and is starting Biktarvy by infectious disease Dr. Royal.  He also has syphilis late latent syphilis for which he is completing intramuscular injection of Bicillin.  He has already completed his third and final injection.    Thrombocytopenia could be related to his HIV and we may need to do a quicker taper on the prednisone.    Patient is currently on 50 mg of prednisone daily.  His platelet count today is 145.  We discussed about keeping him on acyclovir 400 twice daily and Bactrim DS 1 p.o. every Monday Wednesday Friday till his prednisone drops down to 20 mg daily but patient does not want to do that as  he says he is already taking too many pills.    However given that the thrombocytopenia could potentially be related to HIV we discussed about faster taper which he does want to try.  We did warn him that there is always a possibility that his platelets may drop further.  I also discussed with Dr. Royal today and Biktarvy is usually does not drop the blood counts.    April 3, 2024: Patient is currently tolerating Biktarvy.  His prednisone is 20 mg daily.  He has his platelets improved now from 151-179.  We will further do a quicker taper of the prednisone as his thrombocytopenia could be related to his HIV and now that he is on his treatment for HIV likely will improve      Hematologic history:    Patient is a 23-year-old gentleman who has had easy bruising for the last 2 weeks.  He was admitted March 9, 2024.  Patient had blood work drawn and because of low platelets he was sent to the ER.  There was no active bleeding.  His platelets have been 14.  On admission.  His hemoglobin is 14.5 and a white count of 5.44.  Patient has been started on prednisone 1 mg/kg by Dr. Stevens because of concern for ITP.  Patient has been asymptomatic.  Today's platelets are up to 23.  Hemoglobin 15.3 white count of 7.66.  Neutrophils 83%, lymphocytes 15.1%, 1.4% monocytes 0% eosinophils.  Past Medical History, Past Surgical History, Social History, Family History have been reviewed and are without significant changes except as mentioned.    Hospital Course  23 y.o. male presented with easy bruising and found to be thrombocytopenic. Platelets on admission were 23,000. Presumptive diagnosis was made ITP and he was started on steroids and platelets today are 62,000. He was newly diagnosed with HIV antibody was positive. HIV RNA is pending to confirm and ID is going to follow-up with him in a week and likely start him on Biktarvy. He also was newly diagnosed with late latent syphilis and was given bicillin LA 2.4 million units IM  today. ID is planning two more doses in the ACU to complete 3 dose course with a repeat RPR in 6 months. Hepatitis A antibody was also positive. Hematology recommends 70 mg prednisone daily and they will follow-up in a week to taper. He had low normal B12 and started on replacement. Today he is without new complaints. No new bruising or bleeding    Patient seen by infectious disease for HIV positive and latent syphilis by Dr. Adams in the hospital.  Patient had HIV antibody positive and Dr. Royal was awaiting HIV RNA to confirm the diagnosis before starting Biktarvy.  Patient has follow-up appointment March 19, 2024 with Dr. Royal.    RPR 1:32 and likely has new diagnosis of late-latent syphilis. I am going to give him bicillin LA 2.4 million units IM today. I have placed orders for him to get doses #2 and #3 in the ACU on 3/19/24 and 3/26/24, respectively, to complete the 3-dose course. Check RPR again in 6 months to confirm 4-fold decline in titer.         Review of Systems   Review of Systems   Constitutional:  Negative for appetite change, chills, diaphoresis, fatigue, fever and unexpected weight change.   HENT:  Negative for hearing loss, sore throat and trouble swallowing.    Respiratory:  Negative for cough, chest tightness, shortness of breath and wheezing.    Cardiovascular:  Negative for chest pain, palpitations and leg swelling.   Gastrointestinal:  Negative for abdominal distention, abdominal pain, constipation, diarrhea, nausea and vomiting.   Genitourinary:  Negative for dysuria, frequency, hematuria and urgency.   Musculoskeletal:  Negative for joint swelling.        No muscle weakness.   Skin:  Negative for rash and wound.   Neurological:  Negative for seizures, syncope, speech difficulty, weakness, numbness and headaches.   Hematological:  Negative for adenopathy. Does not bruise/bleed easily.   Psychiatric/Behavioral:  Negative for behavioral problems, confusion and suicidal ideas.   "    Review of systems as mentioned in the HPI otherwise negative    Medications:  The current medication list was reviewed in the EMR    ALLERGIES:  No Known Allergies           Vitals:    04/03/24 1226   BP: 111/61   Pulse: 78   Resp: 16   Temp: 98 °F (36.7 °C)   TempSrc: Temporal   SpO2: 96%   Weight: 81.3 kg (179 lb 4.8 oz)   Height: 180.3 cm (70.98\")   PainSc: 0-No pain         Physical Exam    CONSTITUTIONAL:  Vital signs reviewed.  No distress, looks comfortable.  RESPIRATORY:  Normal respiratory effort.  Lungs clear to auscultation bilaterally.  CARDIOVASCULAR:  Normal S1, S2.  No murmurs rubs or gallops.  No significant lower extremity edema.  GASTROINTESTINAL: Abdomen appears unremarkable.  Nontender.  No hepatomegaly.  No splenomegaly.  NEURO: cranial nerves 2-12 grossly intact.  No focal deficits.  Appears to have equal strength all 4 extremities.  MUSCULOSKELETAL:  Unremarkable digits/nails.  No cyanosis or clubbing.  SKIN:  Warm.  No rashes.  Multiple tattoos.  PSYCHIATRIC:  Normal judgment and insight.  Normal mood and affect.    I have reexamined the patient and the results are consistent with the previously documented exam. Susannah Gr MD         RECENT LABS:  WBC   Date Value Ref Range Status   04/03/2024 7.97 3.40 - 10.80 10*3/mm3 Final     Hemoglobin   Date Value Ref Range Status   04/03/2024 14.8 13.0 - 17.7 g/dL Final     Platelets   Date Value Ref Range Status   04/03/2024 171 140 - 450 10*3/mm3 Final           ASSESSMENT/PLAN:  [facility] Room/bed info not found     *ITP  Platelet count 14,000 on presentation with normal WBC count as well as hemoglobin.  Started on prednisone 1 mg/kg, 70 mg daily.  Platelets further improved at 62,000  Tested positive for HIV.  HIV panel pending.  Likely etiology for ITP  Continue prednisone  TEZ pending  B12 borderline at 360  Continue oral vitamin B12  Patient can be discharged home and continue on oral prednisone 70 mg daily.  He will be scheduled " to see Dr. Gr in the clinic next week and we will initiate steroid taper once the platelet counts have normalized.  March 18, 2024: We will decrease prednisone to 60 mg daily for 3 days followed by 50 mg daily till he sees me in 1 week.  Will check IPF at his next appointment.  Unsure if this thrombocytopenia is HIV related versus ITP and could consider faster taper.  March 28, 2024: Currently patient is on 50 mg of prednisone.  Discussed about consideration of acyclovir 400 mg p.o. twice daily and Bactrim DS 1 p.o. every Monday Wednesday Friday to prevent PCP but patient is not agreeable to start dose.  He refuses.  However given that the thrombocytopenia could be related to underlying HIV we have suggested faster taper so that he can come to 20 mg daily.  We will follow him very closely.  He has been tolerating prednisone.  He is not hyper he is able to see sleep.  April 3, 2024: IPF normal, patient's platelets improved to 171.  Will do a quick taper of the steroids as it is not the cause  in thrombocytopenia.     *HIV positive  HIV panel pending  Evaluated by infectious disease and started on treatment for syphilis.  Patient now on penicillin.  Plans for follow-up as an outpatient for HIV care.  Hepatitis B and hepatitis C testing negative  Dr. Royal awaiting to confirm HIV by HIV RNA test.  Patient has appointment March 19, 2024 with Dr. Royal at which time he is going to make a decision about starting Biktarv  His HIV viral load returned with 83,300 and copies confirming the diagnosis. His CD4 count was 271/21%.   Patient started Biktarvy   Thrombocytopenia was likely related to HIV    *.  Late latent syphilis: Patient is on Bicillin 2,400,000 units IM once weekly for total of 3 doses last dose will be March 26, 2024.  Completed 3-day injections intramuscularly of Bicillin      Plan  Patient has confirmation of HIV diagnosis and started on Biktarvy by both Dr. Royal  Patient completed  intramuscular injection of Bicillin, 3-day course for his late latent syphilis  Platelets 171 today we will do a quick taper of his prednisone   he will receive 20 mg daily for 3 days then 10 mg daily for 3 days and subsequently 5 mg daily until he sees us in 2 weeks.  Follow-up with nurse practitioner at 2 weeks and if platelets are normal to discontinue prednisone.  Patient will see me in 6 weeks with labs    Monitoring high risk medications    Susannah Gr MD        Cc: Dr. Royal

## 2024-04-30 ENCOUNTER — OFFICE VISIT (OUTPATIENT)
Dept: INFECTIOUS DISEASES | Facility: CLINIC | Age: 24
End: 2024-04-30
Payer: COMMERCIAL

## 2024-04-30 ENCOUNTER — LAB (OUTPATIENT)
Dept: LAB | Facility: HOSPITAL | Age: 24
End: 2024-04-30
Payer: COMMERCIAL

## 2024-04-30 VITALS
TEMPERATURE: 97.3 F | RESPIRATION RATE: 20 BRPM | WEIGHT: 179.2 LBS | HEART RATE: 91 BPM | BODY MASS INDEX: 25 KG/M2 | SYSTOLIC BLOOD PRESSURE: 114 MMHG | DIASTOLIC BLOOD PRESSURE: 75 MMHG

## 2024-04-30 DIAGNOSIS — Z21 ASYMPTOMATIC HIV INFECTION, WITH NO HISTORY OF HIV-RELATED ILLNESS: Primary | ICD-10-CM

## 2024-04-30 DIAGNOSIS — D69.3 ACUTE ITP: ICD-10-CM

## 2024-04-30 DIAGNOSIS — A51.9 EARLY SYPHILIS: ICD-10-CM

## 2024-04-30 DIAGNOSIS — Z79.2 LONG TERM (CURRENT) USE OF ANTIBIOTICS: ICD-10-CM

## 2024-04-30 LAB
ALBUMIN SERPL-MCNC: 4.5 G/DL (ref 3.5–5.2)
ALBUMIN/GLOB SERPL: 2 G/DL
ALP SERPL-CCNC: 55 U/L (ref 39–117)
ALT SERPL W P-5'-P-CCNC: 17 U/L (ref 1–41)
ANION GAP SERPL CALCULATED.3IONS-SCNC: 11.7 MMOL/L (ref 5–15)
AST SERPL-CCNC: 18 U/L (ref 1–40)
BASOPHILS # BLD AUTO: 0.02 10*3/MM3 (ref 0–0.2)
BASOPHILS NFR BLD AUTO: 0.5 % (ref 0–1.5)
BILIRUB SERPL-MCNC: 0.3 MG/DL (ref 0–1.2)
BUN SERPL-MCNC: 11 MG/DL (ref 6–20)
BUN/CREAT SERPL: 9.8 (ref 7–25)
CALCIUM SPEC-SCNC: 9.7 MG/DL (ref 8.6–10.5)
CHLORIDE SERPL-SCNC: 103 MMOL/L (ref 98–107)
CO2 SERPL-SCNC: 23.3 MMOL/L (ref 22–29)
CREAT SERPL-MCNC: 1.12 MG/DL (ref 0.76–1.27)
DEPRECATED RDW RBC AUTO: 43 FL (ref 37–54)
EGFRCR SERPLBLD CKD-EPI 2021: 94.7 ML/MIN/1.73
EOSINOPHIL # BLD AUTO: 0.11 10*3/MM3 (ref 0–0.4)
EOSINOPHIL NFR BLD AUTO: 2.6 % (ref 0.3–6.2)
ERYTHROCYTE [DISTWIDTH] IN BLOOD BY AUTOMATED COUNT: 12.8 % (ref 12.3–15.4)
GLOBULIN UR ELPH-MCNC: 2.3 GM/DL
GLUCOSE SERPL-MCNC: 93 MG/DL (ref 65–99)
HCT VFR BLD AUTO: 45.9 % (ref 37.5–51)
HGB BLD-MCNC: 15.6 G/DL (ref 13–17.7)
IMM GRANULOCYTES # BLD AUTO: 0.01 10*3/MM3 (ref 0–0.05)
IMM GRANULOCYTES NFR BLD AUTO: 0.2 % (ref 0–0.5)
LYMPHOCYTES # BLD AUTO: 2.11 10*3/MM3 (ref 0.7–3.1)
LYMPHOCYTES NFR BLD AUTO: 49.3 % (ref 19.6–45.3)
MCH RBC QN AUTO: 30.7 PG (ref 26.6–33)
MCHC RBC AUTO-ENTMCNC: 34 G/DL (ref 31.5–35.7)
MCV RBC AUTO: 90.4 FL (ref 79–97)
MONOCYTES # BLD AUTO: 0.57 10*3/MM3 (ref 0.1–0.9)
MONOCYTES NFR BLD AUTO: 13.3 % (ref 5–12)
NEUTROPHILS NFR BLD AUTO: 1.46 10*3/MM3 (ref 1.7–7)
NEUTROPHILS NFR BLD AUTO: 34.1 % (ref 42.7–76)
NRBC BLD AUTO-RTO: 0 /100 WBC (ref 0–0.2)
PLATELET # BLD AUTO: 261 10*3/MM3 (ref 140–450)
PMV BLD AUTO: 11.5 FL (ref 6–12)
POTASSIUM SERPL-SCNC: 3.9 MMOL/L (ref 3.5–5.2)
PROT SERPL-MCNC: 6.8 G/DL (ref 6–8.5)
RBC # BLD AUTO: 5.08 10*6/MM3 (ref 4.14–5.8)
SODIUM SERPL-SCNC: 138 MMOL/L (ref 136–145)
WBC NRBC COR # BLD AUTO: 4.28 10*3/MM3 (ref 3.4–10.8)

## 2024-04-30 PROCEDURE — 86361 T CELL ABSOLUTE COUNT: CPT | Performed by: INTERNAL MEDICINE

## 2024-04-30 PROCEDURE — 85025 COMPLETE CBC W/AUTO DIFF WBC: CPT | Performed by: INTERNAL MEDICINE

## 2024-04-30 PROCEDURE — 87536 HIV-1 QUANT&REVRSE TRNSCRPJ: CPT | Performed by: INTERNAL MEDICINE

## 2024-04-30 PROCEDURE — 36415 COLL VENOUS BLD VENIPUNCTURE: CPT | Performed by: INTERNAL MEDICINE

## 2024-04-30 PROCEDURE — 99214 OFFICE O/P EST MOD 30 MIN: CPT | Performed by: INTERNAL MEDICINE

## 2024-04-30 PROCEDURE — 80053 COMPREHEN METABOLIC PANEL: CPT | Performed by: INTERNAL MEDICINE

## 2024-04-30 RX ORDER — BICTEGRAVIR SODIUM, EMTRICITABINE, AND TENOFOVIR ALAFENAMIDE FUMARATE 50; 200; 25 MG/1; MG/1; MG/1
1 TABLET ORAL DAILY
COMMUNITY
Start: 2024-04-22

## 2024-04-30 NOTE — PROGRESS NOTES
ID CLINIC NOTE    CC: f/u HIV    HPI: Choco Rico is a 23 y.o. male here for f/u HIV.  At his last clinic visit on 3/19/2024, I started him on Biktarvy once daily.  He is here today for follow-up.  He says he is tolerating the Biktarvy well without any side effects.  No missed doses.    He ended up getting 3 IM penicillin injections for syphilis.    His ITP appears to be improving.  He remains on prednisone per hematology.    No new health concerns to report.    He says that he took a home HIV test that was negative so asked me if I was certain that his result was accurate.  I showed him the HIV viral load report being positive for 83,000 copies of the virus.      PMH:  HIV  ITP  Early syphilis - treated Mar 2024    Past Surgical History:   Procedure Laterality Date    WISDOM TOOTH EXTRACTION       Social History:  Lives in [place], [place] on the weekends but is in [place] during the week for work  Works in a warehouse    Antibiotic allergies and intolerances:  NONE    Medications:     Current Outpatient Medications:     Biktarvy -25 MG per tablet, Take 1 tablet by mouth Daily., Disp: , Rfl:     famotidine (Pepcid) 40 MG tablet, Take 1 tablet by mouth 2 (Two) Times a Day., Disp: 60 tablet, Rfl: 0    predniSONE (DELTASONE) 10 MG tablet, Four po x 3 days and then three po x 3 days ans then 2 po daily, Disp: 60 tablet, Rfl: 2    vitamin B-12 (VITAMIN B-12) 1000 MCG tablet, Take 1 tablet by mouth Daily. (Patient not taking: Reported on 4/30/2024), Disp: 30 tablet, Rfl: 0      OBJECTIVE:  /75   Pulse 91   Temp 97.3 °F (36.3 °C)   Resp 20   Wt 81.3 kg (179 lb 3.2 oz)   BMI 25.00 kg/m²     General: awake, alert, NAD  Eyes: no scleral icterus  ENT: no thrush  Cardiovascular: NR  Respiratory: normal work of breathing on RA  GI: Abdomen is soft, not tender or distended  Neurological: Alert and oriented x 3  Psychiatric: Normal mood and affect     DIAGNOSTICS:  CBC, BMP, and all HIV-related labs reviewed  today  Lab Results   Component Value Date    WBC 7.97 04/03/2024    HGB 14.8 04/03/2024    HCT 43.0 04/03/2024     04/03/2024     Lab Results   Component Value Date    GLUCOSE 86 04/03/2024    CALCIUM 9.6 04/03/2024     04/03/2024    K 4.3 04/03/2024    CO2 25.3 04/03/2024     04/03/2024    BUN 17 04/03/2024    CREATININE 1.03 04/03/2024    EGFR 104.7 04/03/2024    BCR 16.5 04/03/2024    ANIONGAP 10.7 04/03/2024       HIV-related labs:  HIV antibody positive (3/2024)  HIV RNA: 83,300 copies (3/2024)  HIV Genotype: K103 mutation (EFV resistance) (3/2024)  CD4 count 274/21% (3/2024)  HLA-: negative (3/2024)  Hep A total antibody positive  Hep B surface antigen non-reactive  Hep B core IgM Ab non-reactive  Hep B surface antibody non-reactive  Hep C antibody negative  RPR 1:32; TPA-Abs positive (3/2024)  Urine GC/CT NAAT negative (3/2024)  Toxo Ab negative (3/2024)  Crypto Ag negative  ----------------------------------------------  TEZ negative      ASSESSMENT/PLAN:  New diagnosis HIV  Thrombocytopenia due to #1 versus ITP  Early syphilis    For HIV, continue Biktarvy once daily.  Prescription through [facility].  I will have him go to the lab today for CBC, CMP, HIV viral load, and CD4 count.  Congratulated his compliance.    For syphilis, he received 3 weekly doses of intramuscular penicillin.  I will repeat an RPR in late September 2024 to make sure his titer has decreased four-fold.    He will continue to follow-up with hematology regarding ITP.  No objection to prednisone from my standpoint as it is needed for ITP.  Case was previously discussed with Dr. Gr.         RTC 3 weeks for clinical follow-up and labs.

## 2024-05-01 LAB
BASOPHILS # BLD AUTO: 0 X10E3/UL (ref 0–0.2)
BASOPHILS NFR BLD AUTO: 1 %
CD3+CD4+ CELLS # BLD: 737 /UL (ref 359–1519)
CD3+CD4+ CELLS NFR BLD: 35.1 % (ref 30.8–58.5)
EOSINOPHIL # BLD AUTO: 0.1 X10E3/UL (ref 0–0.4)
EOSINOPHIL NFR BLD AUTO: 3 %
ERYTHROCYTE [DISTWIDTH] IN BLOOD BY AUTOMATED COUNT: 12.9 % (ref 11.6–15.4)
HCT VFR BLD AUTO: 44.5 % (ref 37.5–51)
HGB BLD-MCNC: 15.4 G/DL (ref 13–17.7)
HIV1 RNA # SERPL NAA+PROBE: 50 COPIES/ML
HIV1 RNA SERPL NAA+PROBE-LOG#: 1.7 LOG10COPY/ML
IMM GRANULOCYTES # BLD AUTO: 0 X10E3/UL (ref 0–0.1)
IMM GRANULOCYTES NFR BLD AUTO: 0 %
LYMPHOCYTES # BLD AUTO: 2.1 X10E3/UL (ref 0.7–3.1)
LYMPHOCYTES NFR BLD AUTO: 50 %
MCH RBC QN AUTO: 30.9 PG (ref 26.6–33)
MCHC RBC AUTO-ENTMCNC: 34.6 G/DL (ref 31.5–35.7)
MCV RBC AUTO: 89 FL (ref 79–97)
MONOCYTES # BLD AUTO: 0.5 X10E3/UL (ref 0.1–0.9)
MONOCYTES NFR BLD AUTO: 13 %
NEUTROPHILS # BLD AUTO: 1.4 X10E3/UL (ref 1.4–7)
NEUTROPHILS NFR BLD AUTO: 33 %
PLATELET # BLD AUTO: 242 X10E3/UL (ref 150–450)
RBC # BLD AUTO: 4.98 X10E6/UL (ref 4.14–5.8)
WBC # BLD AUTO: 4.2 X10E3/UL (ref 3.4–10.8)

## 2024-05-03 ENCOUNTER — PATIENT ROUNDING (BHMG ONLY) (OUTPATIENT)
Dept: INFECTIOUS DISEASES | Facility: CLINIC | Age: 24
End: 2024-05-03
Payer: COMMERCIAL

## 2024-07-30 ENCOUNTER — OFFICE VISIT (OUTPATIENT)
Dept: INFECTIOUS DISEASES | Facility: CLINIC | Age: 24
End: 2024-07-30
Payer: COMMERCIAL

## 2024-07-30 ENCOUNTER — LAB (OUTPATIENT)
Dept: LAB | Facility: HOSPITAL | Age: 24
End: 2024-07-30
Payer: COMMERCIAL

## 2024-07-30 VITALS
HEART RATE: 73 BPM | SYSTOLIC BLOOD PRESSURE: 103 MMHG | RESPIRATION RATE: 20 BRPM | TEMPERATURE: 97.5 F | WEIGHT: 166 LBS | BODY MASS INDEX: 23.16 KG/M2 | DIASTOLIC BLOOD PRESSURE: 68 MMHG

## 2024-07-30 DIAGNOSIS — Z79.2 LONG TERM (CURRENT) USE OF ANTIBIOTICS: ICD-10-CM

## 2024-07-30 DIAGNOSIS — Z21 ASYMPTOMATIC HIV INFECTION, WITH NO HISTORY OF HIV-RELATED ILLNESS: Primary | ICD-10-CM

## 2024-07-30 DIAGNOSIS — D69.3 ACUTE ITP: ICD-10-CM

## 2024-07-30 LAB
ALBUMIN SERPL-MCNC: 4.7 G/DL (ref 3.5–5.2)
ALBUMIN/GLOB SERPL: 2 G/DL
ALP SERPL-CCNC: 56 U/L (ref 39–117)
ALT SERPL W P-5'-P-CCNC: 18 U/L (ref 1–41)
ANION GAP SERPL CALCULATED.3IONS-SCNC: 8.3 MMOL/L (ref 5–15)
AST SERPL-CCNC: 20 U/L (ref 1–40)
BASOPHILS # BLD AUTO: 0.03 10*3/MM3 (ref 0–0.2)
BASOPHILS NFR BLD AUTO: 0.8 % (ref 0–1.5)
BILIRUB SERPL-MCNC: 0.4 MG/DL (ref 0–1.2)
BUN SERPL-MCNC: 11 MG/DL (ref 6–20)
BUN/CREAT SERPL: 10.1 (ref 7–25)
CALCIUM SPEC-SCNC: 9.2 MG/DL (ref 8.6–10.5)
CHLORIDE SERPL-SCNC: 104 MMOL/L (ref 98–107)
CO2 SERPL-SCNC: 25.7 MMOL/L (ref 22–29)
CREAT SERPL-MCNC: 1.09 MG/DL (ref 0.76–1.27)
DEPRECATED RDW RBC AUTO: 43.5 FL (ref 37–54)
EGFRCR SERPLBLD CKD-EPI 2021: 97.2 ML/MIN/1.73
EOSINOPHIL # BLD AUTO: 0.11 10*3/MM3 (ref 0–0.4)
EOSINOPHIL NFR BLD AUTO: 2.8 % (ref 0.3–6.2)
ERYTHROCYTE [DISTWIDTH] IN BLOOD BY AUTOMATED COUNT: 13 % (ref 12.3–15.4)
GLOBULIN UR ELPH-MCNC: 2.4 GM/DL
GLUCOSE SERPL-MCNC: 83 MG/DL (ref 65–99)
HCT VFR BLD AUTO: 45.8 % (ref 37.5–51)
HGB BLD-MCNC: 15.5 G/DL (ref 13–17.7)
IMM GRANULOCYTES # BLD AUTO: 0.01 10*3/MM3 (ref 0–0.05)
IMM GRANULOCYTES NFR BLD AUTO: 0.3 % (ref 0–0.5)
LYMPHOCYTES # BLD AUTO: 1.96 10*3/MM3 (ref 0.7–3.1)
LYMPHOCYTES NFR BLD AUTO: 50.5 % (ref 19.6–45.3)
MCH RBC QN AUTO: 31.1 PG (ref 26.6–33)
MCHC RBC AUTO-ENTMCNC: 33.8 G/DL (ref 31.5–35.7)
MCV RBC AUTO: 92 FL (ref 79–97)
MONOCYTES # BLD AUTO: 0.4 10*3/MM3 (ref 0.1–0.9)
MONOCYTES NFR BLD AUTO: 10.3 % (ref 5–12)
NEUTROPHILS NFR BLD AUTO: 1.37 10*3/MM3 (ref 1.7–7)
NEUTROPHILS NFR BLD AUTO: 35.3 % (ref 42.7–76)
NRBC BLD AUTO-RTO: 0 /100 WBC (ref 0–0.2)
PLATELET # BLD AUTO: 240 10*3/MM3 (ref 140–450)
PMV BLD AUTO: 10.6 FL (ref 6–12)
POTASSIUM SERPL-SCNC: 4.4 MMOL/L (ref 3.5–5.2)
PROT SERPL-MCNC: 7.1 G/DL (ref 6–8.5)
RBC # BLD AUTO: 4.98 10*6/MM3 (ref 4.14–5.8)
SODIUM SERPL-SCNC: 138 MMOL/L (ref 136–145)
WBC NRBC COR # BLD AUTO: 3.88 10*3/MM3 (ref 3.4–10.8)

## 2024-07-30 PROCEDURE — 85025 COMPLETE CBC W/AUTO DIFF WBC: CPT | Performed by: INTERNAL MEDICINE

## 2024-07-30 PROCEDURE — 99214 OFFICE O/P EST MOD 30 MIN: CPT | Performed by: INTERNAL MEDICINE

## 2024-07-30 PROCEDURE — 87536 HIV-1 QUANT&REVRSE TRNSCRPJ: CPT | Performed by: INTERNAL MEDICINE

## 2024-07-30 PROCEDURE — 86361 T CELL ABSOLUTE COUNT: CPT | Performed by: INTERNAL MEDICINE

## 2024-07-30 PROCEDURE — 36415 COLL VENOUS BLD VENIPUNCTURE: CPT | Performed by: INTERNAL MEDICINE

## 2024-07-30 PROCEDURE — 80053 COMPREHEN METABOLIC PANEL: CPT | Performed by: INTERNAL MEDICINE

## 2024-07-30 NOTE — PROGRESS NOTES
ID CLINIC NOTE    CC: f/u HIV    HPI: Choco Rico is a 24 y.o. male here for f/u HIV.  He started Biktarvy on 3/19/2024.  He says he is tolerating the Biktarvy well without any side effects. No missed doses.  He takes it each day in the evening.     At his last visit, he exhibited an excellent virologic response to Biktarvy with his viral load down to just 50 copies.    His platelets have recovered.  He is no longer on prednisone or following up with hematology.    No new health concerns to report. He still working the same job. No current sexual partners.     PMH:  HIV  ITP - resolved  Early syphilis - treated Mar 2024    Past Surgical History:   Procedure Laterality Date    WISDOM TOOTH EXTRACTION       Social History:  Lives in [place], [place] on the weekends but is in [place] during the week for work  Works in a [employer]    Antibiotic allergies and intolerances:  None    Medications:     Current Outpatient Medications:     Biktarvy -25 MG per tablet, Take 1 tablet by mouth Daily., Disp: , Rfl:     famotidine (Pepcid) 40 MG tablet, Take 1 tablet by mouth 2 (Two) Times a Day., Disp: 60 tablet, Rfl: 0    predniSONE (DELTASONE) 10 MG tablet, Four po x 3 days and then three po x 3 days ans then 2 po daily, Disp: 60 tablet, Rfl: 2    vitamin B-12 (VITAMIN B-12) 1000 MCG tablet, Take 1 tablet by mouth Daily. (Patient not taking: Reported on 4/30/2024), Disp: 30 tablet, Rfl: 0      OBJECTIVE:  /68   Pulse 73   Temp 97.5 °F (36.4 °C)   Resp 20   Wt 75.3 kg (166 lb)   BMI 23.16 kg/m²     General: awake, alert, NAD  Eyes: no scleral icterus  ENT: no thrush  Cardiovascular: NR  Respiratory: normal work of breathing on RA  GI: Abdomen is soft, not tender or distended  Neurological: Alert and oriented x 3  Psychiatric: Normal mood and affect     DIAGNOSTICS:  CBC, CMP, and all HIV-related labs reviewed today  Lab Results   Component Value Date    WBC 4.28 04/30/2024    HGB 15.6 04/30/2024    HCT 45.9  04/30/2024     04/30/2024     Lab Results   Component Value Date    GLUCOSE 93 04/30/2024    CALCIUM 9.7 04/30/2024     04/30/2024    K 3.9 04/30/2024    CO2 23.3 04/30/2024     04/30/2024    BUN 11 04/30/2024    CREATININE 1.12 04/30/2024    EGFR 94.7 04/30/2024    BCR 9.8 04/30/2024    ANIONGAP 11.7 04/30/2024     HIV-related labs:  HIV antibody positive (3/2024)  HIV RNA: 50 copies (4/2024)  HIV Genotype: K103 mutation (EFV resistance) (3/2024)  CD4 count: 737/35% (4/2024)  HLA-: negative (3/2024)  Hep A total antibody positive  Hep B surface antigen non-reactive  Hep B core IgM Ab non-reactive  Hep B surface antibody non-reactive  Hep C antibody negative  RPR 1:32; TPA-Abs positive (3/2024)  Urine GC/CT NAAT negative (3/2024)  Toxo Ab negative (3/2024)  Crypto Ag negative      ASSESSMENT/PLAN:  HIV, not symptomatic  Thrombocytopenia due to #1 versus ITP -resolved  Early syphilis -treated    For HIV, continue Biktarvy once daily.  Prescription through [facility].  I will have him go to the lab today for CBC, CMP, HIV viral load, and CD4 count.  Encouraged his ongoing excellent compliance.     For syphilis, he received 3 weekly doses of intramuscular penicillin (initially it was thought he had late-latent syphilis but later discovered it was early syphilis).  I will repeat an RPR at next visit to make sure his titer has decreased four-fold.    His platelets have normalized.  He is no longer on steroids.  It looks like he missed his last visit with hematology at the end of May 2024.  That being said, I am not sure there was much left to do there anyway since he is off of prednisone and his platelets have recovered and his HIV is being treated effectively.    RTC 6 months or sooner if needed

## 2024-07-31 LAB
BASOPHILS # BLD AUTO: 0 X10E3/UL (ref 0–0.2)
BASOPHILS NFR BLD AUTO: 1 %
CD3+CD4+ CELLS # BLD: 678 /UL (ref 359–1519)
CD3+CD4+ CELLS NFR BLD: 33.9 % (ref 30.8–58.5)
EOSINOPHIL # BLD AUTO: 0.1 X10E3/UL (ref 0–0.4)
EOSINOPHIL NFR BLD AUTO: 3 %
ERYTHROCYTE [DISTWIDTH] IN BLOOD BY AUTOMATED COUNT: 12.7 % (ref 11.6–15.4)
HCT VFR BLD AUTO: 46.8 % (ref 37.5–51)
HGB BLD-MCNC: 15.8 G/DL (ref 13–17.7)
HIV1 RNA # SERPL NAA+PROBE: <20 COPIES/ML
HIV1 RNA SERPL NAA+PROBE-LOG#: NORMAL LOG10COPY/ML
IMM GRANULOCYTES # BLD AUTO: 0 X10E3/UL (ref 0–0.1)
IMM GRANULOCYTES NFR BLD AUTO: 0 %
LYMPHOCYTES # BLD AUTO: 2 X10E3/UL (ref 0.7–3.1)
LYMPHOCYTES NFR BLD AUTO: 48 %
MCH RBC QN AUTO: 31.5 PG (ref 26.6–33)
MCHC RBC AUTO-ENTMCNC: 33.8 G/DL (ref 31.5–35.7)
MCV RBC AUTO: 93 FL (ref 79–97)
MONOCYTES # BLD AUTO: 0.4 X10E3/UL (ref 0.1–0.9)
MONOCYTES NFR BLD AUTO: 11 %
NEUTROPHILS # BLD AUTO: 1.5 X10E3/UL (ref 1.4–7)
NEUTROPHILS NFR BLD AUTO: 37 %
PLATELET # BLD AUTO: 246 X10E3/UL (ref 150–450)
RBC # BLD AUTO: 5.01 X10E6/UL (ref 4.14–5.8)
WBC # BLD AUTO: 4 X10E3/UL (ref 3.4–10.8)

## 2024-10-08 RX ORDER — BICTEGRAVIR SODIUM, EMTRICITABINE, AND TENOFOVIR ALAFENAMIDE FUMARATE 50; 200; 25 MG/1; MG/1; MG/1
1 TABLET ORAL DAILY
Qty: 30 TABLET | Refills: 11 | Status: SHIPPED | OUTPATIENT
Start: 2024-10-08 | End: 2024-11-07

## 2024-12-05 ENCOUNTER — TELEPHONE (OUTPATIENT)
Dept: INFECTIOUS DISEASES | Facility: CLINIC | Age: 24
End: 2024-12-05
Payer: COMMERCIAL

## 2024-12-05 NOTE — TELEPHONE ENCOUNTER
I called patient per Dr. Royal's request. I have provided him with the phone number for [facility] and informed him the samples of Biktarvy and here and ready to be picked up. Patient planning to come today and states he will also contact [facility]. NABIL, RN

## 2025-01-07 ENCOUNTER — TELEPHONE (OUTPATIENT)
Dept: INFECTIOUS DISEASES | Facility: CLINIC | Age: 25
End: 2025-01-07
Payer: COMMERCIAL

## 2025-01-07 NOTE — TELEPHONE ENCOUNTER
A fax was received from [facility] indicating that they have been unable to reach patient to schedule his refill on Biktarvy and so Dr. Royal requested that patient be contacted to clarify.  I reviewed patient's chart and saw that patient was supposed to be contacting [facility] program & so I then called him to see if he had contacted them. There was no answer and so I left patient a voice message to please call back so I could verify if he has been able to obtain his biktarvy med thru the [facility] program and that our office had received fax from [facility] about trying to reach him to deliver meds.

## 2025-01-22 ENCOUNTER — TELEPHONE (OUTPATIENT)
Dept: INFECTIOUS DISEASES | Facility: CLINIC | Age: 25
End: 2025-01-22
Payer: COMMERCIAL

## 2025-01-22 NOTE — TELEPHONE ENCOUNTER
Rec'd call from patient stating that he will need his Biktarvy prescriptions sent to [facility] in [place] since he has been enrolled in their [facility] Program. He asked if Dr. Royal would go ahead and send a Biktarvy Rx to them. He also changed his appt w/ Dr. Royal from 1/28/25 to March as he said that his insurance was changing and would not go into affect until a few months.   He also said that he had been going to the  Clinic and he had  been assessed there.  I told him I would send a message to Dr. Royal to update him.

## 2025-01-23 RX ORDER — BICTEGRAVIR SODIUM, EMTRICITABINE, AND TENOFOVIR ALAFENAMIDE FUMARATE 50; 200; 25 MG/1; MG/1; MG/1
1 TABLET ORAL DAILY
Qty: 30 TABLET | Refills: 11 | Status: SHIPPED | OUTPATIENT
Start: 2025-01-23 | End: 2025-02-22

## 2025-01-24 ENCOUNTER — TELEPHONE (OUTPATIENT)
Dept: INFECTIOUS DISEASES | Facility: CLINIC | Age: 25
End: 2025-01-24
Payer: COMMERCIAL

## 2025-01-24 NOTE — TELEPHONE ENCOUNTER
After receiving message from Dr. Royal, I called patient and left voice message that Dr. Royal had sent in the med prescription he had requested to [facility]. I also left our office phone # for any questions patient may have.    ----- Message from Tony Royal sent at 1/23/2025 10:44 AM EST -----  Done  ----- Message -----  From: Ama Westbrook RN  Sent: 1/23/2025   7:45 AM EST  To: Tony Royal MD; #    Pt states he is now enrolled in the [facility] program and is requesting if you would please send prescription for Biktarvy to the [facility] pharmacy in [place] (updated in Epic). Also he had to change his [facility]. appt w/ you to March since his new insurance should go into effect by then.   Thank you.